# Patient Record
Sex: MALE | Race: WHITE | NOT HISPANIC OR LATINO | Employment: UNEMPLOYED | URBAN - METROPOLITAN AREA
[De-identification: names, ages, dates, MRNs, and addresses within clinical notes are randomized per-mention and may not be internally consistent; named-entity substitution may affect disease eponyms.]

---

## 2017-11-07 PROBLEM — R07.9 ACUTE CHEST PAIN: Status: ACTIVE | Noted: 2017-11-07

## 2017-11-08 PROBLEM — R25.2 BILATERAL LEG CRAMPS: Status: ACTIVE | Noted: 2017-11-08

## 2017-11-08 PROBLEM — Z95.810 AICD (AUTOMATIC CARDIOVERTER/DEFIBRILLATOR) PRESENT: Status: ACTIVE | Noted: 2017-11-08

## 2017-11-08 PROBLEM — I42.0 ISCHEMIC DILATED CARDIOMYOPATHY: Status: ACTIVE | Noted: 2017-11-08

## 2017-11-08 PROBLEM — M62.58 MUSCLE ATROPHY OF LOWER EXTREMITY: Status: ACTIVE | Noted: 2017-11-08

## 2017-11-08 PROBLEM — I20.0 UNSTABLE ANGINA: Status: ACTIVE | Noted: 2017-11-08

## 2017-11-08 PROBLEM — I25.5 ISCHEMIC DILATED CARDIOMYOPATHY: Status: ACTIVE | Noted: 2017-11-08

## 2017-11-09 PROBLEM — I20.0 UNSTABLE ANGINA: Status: RESOLVED | Noted: 2017-11-08 | Resolved: 2017-11-09

## 2017-11-09 PROBLEM — R25.2 BILATERAL LEG CRAMPS: Status: RESOLVED | Noted: 2017-11-08 | Resolved: 2017-11-09

## 2017-11-09 PROBLEM — R07.9 ACUTE CHEST PAIN: Status: RESOLVED | Noted: 2017-11-07 | Resolved: 2017-11-09

## 2017-11-09 PROBLEM — M62.58 MUSCLE ATROPHY OF LOWER EXTREMITY: Status: RESOLVED | Noted: 2017-11-08 | Resolved: 2017-11-09

## 2018-01-18 ENCOUNTER — TELEPHONE (OUTPATIENT)
Dept: NEUROLOGY | Facility: CLINIC | Age: 58
End: 2018-01-18

## 2018-01-18 NOTE — TELEPHONE ENCOUNTER
----- Message from Nunu Kolb sent at 1/18/2018  1:05 PM CST -----  Contact: Pt can be reached at [phone]  Pt is calling to request to schedule an appt for Neuromuscular for second opinion.      Please    Thank you!

## 2018-02-14 ENCOUNTER — OFFICE VISIT (OUTPATIENT)
Dept: NEUROLOGY | Facility: CLINIC | Age: 58
End: 2018-02-14
Payer: COMMERCIAL

## 2018-02-14 VITALS
HEART RATE: 66 BPM | DIASTOLIC BLOOD PRESSURE: 76 MMHG | WEIGHT: 224.19 LBS | BODY MASS INDEX: 27.87 KG/M2 | HEIGHT: 75 IN | SYSTOLIC BLOOD PRESSURE: 111 MMHG

## 2018-02-14 DIAGNOSIS — I10 ESSENTIAL HYPERTENSION: ICD-10-CM

## 2018-02-14 DIAGNOSIS — R20.2 PARESTHESIA OF BOTH LEGS: ICD-10-CM

## 2018-02-14 DIAGNOSIS — R73.9 BORDERLINE HYPERGLYCEMIA: ICD-10-CM

## 2018-02-14 DIAGNOSIS — E78.5 DYSLIPIDEMIA: ICD-10-CM

## 2018-02-14 DIAGNOSIS — M62.58 MUSCLE ATROPHY OF LOWER EXTREMITY: Primary | ICD-10-CM

## 2018-02-14 DIAGNOSIS — R25.2 BILATERAL LEG CRAMPS: ICD-10-CM

## 2018-02-14 PROCEDURE — 99215 OFFICE O/P EST HI 40 MIN: CPT | Mod: S$GLB,,, | Performed by: PSYCHIATRY & NEUROLOGY

## 2018-02-14 PROCEDURE — 3008F BODY MASS INDEX DOCD: CPT | Mod: S$GLB,,, | Performed by: PSYCHIATRY & NEUROLOGY

## 2018-02-14 PROCEDURE — 99999 PR PBB SHADOW E&M-EST. PATIENT-LVL III: CPT | Mod: PBBFAC,,, | Performed by: PSYCHIATRY & NEUROLOGY

## 2018-02-14 RX ORDER — CARBAMAZEPINE 100 MG/1
100 TABLET, EXTENDED RELEASE ORAL 2 TIMES DAILY
Qty: 60 TABLET | Refills: 11 | Status: SHIPPED | OUTPATIENT
Start: 2018-02-14 | End: 2018-05-28 | Stop reason: SDUPTHER

## 2018-02-14 NOTE — LETTER
February 14, 2018      [facility] - Neurology  [address]  [facility] [address]  Phone: [phone]  Fax: [phone]       Patient: Hector Garcia   YOB: 1960  Date of Visit: 02/14/2018    To Whom It May Concern:    Deandre Garcia  was at [facility] on 02/14/2018. He may return to work/school on 2/15/2018 with no restrictions. If you have any questions or concerns, or if I can be of further assistance, please do not hesitate to contact me.    Sincerely,    Kee López MD

## 2018-02-15 ENCOUNTER — PATIENT MESSAGE (OUTPATIENT)
Dept: NEUROLOGY | Facility: CLINIC | Age: 58
End: 2018-02-15

## 2018-02-15 NOTE — PROGRESS NOTES
Subjective:     Chief Complaint:  Consult      History of Present Illness:  Hector Garcia is a 57 y.o. male who presents today for initial evaluation of bilateral LE cramping, paresthesias, and progressive atrophy. He has previously seen Dr. Wilkinson, who referred him here for futher work-up. Symptoms began in April 2017 with troubles in LE with weakness, increased difficulty controlling motor skills, tingling and severe gastrocnemius and foot muscle cramping. UEs have been unaffected. He has noted diminished thigh and calf girth as well as a 20 pound weight loss since then. He still works full time as a  but has been having increasing difficulties standing for prolonged periods of time and his gait is impaired by the end of the day. Her has had work up that produced CSF protein of 94 (90L and 10N) as well as EDX which showed absent LE SNAPs and reduced CMAPs with CVs in the low 30s consistent with demyelination. Results are likely too severe to be explained by DM2 alone. He has had transient elevation of CK in the 600s, but mostly it has been normal. He was initially stopped on statins but that has not improved leg cramping.    Review of Systems  Review of Systems   Constitutional: Positive for activity change, fatigue and unexpected weight change. Negative for fever.   HENT: Negative for hearing loss, trouble swallowing and voice change.    Eyes: Negative for pain, redness and visual disturbance.   Respiratory: Negative for choking, chest tightness and shortness of breath.    Cardiovascular: Negative for chest pain.   Gastrointestinal: Negative for abdominal pain, nausea and vomiting.   Endocrine: Negative for cold intolerance.   Genitourinary: Negative for frequency.   Musculoskeletal: Positive for gait problem and myalgias. Negative for arthralgias, back pain, joint swelling and neck pain.   Skin: Negative for color change.   Allergic/Immunologic: Negative for immunocompromised state.  "  Neurological: Positive for weakness. Negative for dizziness, seizures, speech difficulty, numbness and headaches.   Hematological: Negative for adenopathy.   Psychiatric/Behavioral: Negative for agitation, behavioral problems, dysphoric mood and suicidal ideas.        Objective:     Vitals:    02/14/18 1328   BP: 111/76   Pulse: 66   Weight: 101.7 kg (224 lb 3.3 oz)   Height: 6' 3" (1.905 m)   PainSc:   4       Neurologic Exam     Mental Status   Oriented to person, place, and time.   Attention: normal.   Speech: speech is normal   Level of consciousness: alert  Knowledge: good.     Cranial Nerves     CN II   Visual fields full to confrontation.     CN III, IV, VI   Pupils are equal, round, and reactive to light.  Extraocular motions are normal.     CN V   Facial sensation intact.     CN VII   Facial expression full, symmetric.     CN VIII   Hearing: intact    CN IX, X   CN IX normal.     CN XI   CN XI normal.     CN XII   CN XII normal.     Motor Exam   Muscle bulk: normal  Overall muscle tone: normal    Strength   Strength 5/5 throughout.   Very strong throughout on exam, but he complains of being weaker than his baseline, which I suspect is true.    Thighs remain strong but are thin compared to distal LEs     Sensory Exam   Light touch normal.   Right leg vibration: decreased from ankle  Left leg vibration: decreased from ankle    Gait, Coordination, and Reflexes     Gait  Gait: normal    Tremor   Resting tremor: absent  Intention tremor: absent  Action tremor: absent    Reflexes   Right brachioradialis: 2+  Left brachioradialis: 2+  Right biceps: 2+  Left biceps: 2+  Right triceps: 2+  Left triceps: 2+  Right patellar: 1+  Left patellar: 1+  Right achilles: 1+  Left achilles: 1+  Right plantar: normal  Left plantar: normal  Right Ann: absent  Left Ann: absent      Physical Exam   Constitutional: He is oriented to person, place, and time. He appears well-developed and well-nourished.   HENT:   Head: " Normocephalic and atraumatic.   Eyes: EOM are normal. Pupils are equal, round, and reactive to light.   Neck: Normal range of motion. Neck supple. No thyromegaly present.   Cardiovascular: Normal rate.    Pulmonary/Chest: Effort normal.   Abdominal: Soft.   Lymphadenopathy:     He has no cervical adenopathy.   Neurological: He is oriented to person, place, and time. He has normal strength. Gait normal.   Reflex Scores:       Tricep reflexes are 2+ on the right side and 2+ on the left side.       Bicep reflexes are 2+ on the right side and 2+ on the left side.       Brachioradialis reflexes are 2+ on the right side and 2+ on the left side.       Patellar reflexes are 1+ on the right side and 1+ on the left side.       Achilles reflexes are 1+ on the right side and 1+ on the left side.  Skin: Skin is warm and dry.   Psychiatric: He has a normal mood and affect. His speech is normal and behavior is normal. Thought content normal.   Vitals reviewed.        Lab Results   Component Value Date    WBC 7.70 11/09/2017    HGB 15.3 11/09/2017    HCT 45.9 11/09/2017     (L) 11/09/2017    ALT 30 11/07/2017    AST 30 11/07/2017     02/14/2018    K 5.0 02/14/2018     02/14/2018    CREATININE 1.2 02/14/2018    BUN 20 02/14/2018    CO2 27 02/14/2018    TSH 2.37 11/21/2017    HGBA1C 6.2 (H) 11/21/2017    [id number] 738 11/21/2017       CSF Protein = 94  CSF WBCs = 6 (90L and 10N)    EDX 11/17/2017 from outside facility shows absent sural and common peroneal SNAPs, reduced amplitudes and slow CVs in LE CMAPs consistent with conduction block / temporal dispersion and demyelination (though not reported in the assessment)    Assessment and Plan:     Problem List Items Addressed This Visit     Bilateral leg cramps    Current Assessment & Plan     Worse in the calves and the foot intrinsic muscles, though also occurring in the proximal legs. Worsened with prolonged standing and with use. Labs since April 2017 show a  transient increase in the CK up to 600s, though this is in the setting of working out and prolonged standing at work. Possible element of dehydration and electrolyte deficiency, but primarily related to the process contributing to loss of leg muscle mass and relative weakness. No longer taking statins and there has been no clinical improvement since stopping them several months ago. CSF and NCS suggestive of CIDP, though he does have well-preserved DTRs on examination today.   - Trial of Tegretol 100 Q12 hours   - BMP today and in one month to monitor sodium   - Work on proper hydration           Relevant Orders    BASIC METABOLIC PANEL (Completed)    EMG W/ ULTRASOUND AND NERVE CONDUCTION TEST 2 Extremities    Muscle atrophy of lower extremity - Primary    Current Assessment & Plan     Progressive atrophy since April 2017 along with total weight loss of about 20 pounds, primarily in the LEs. Diminished thigh and calf circumferences. EDX and SCF protein are suggestive of CIDP. Patient is not interested in pursuing IVIg or steroids at this time. He will do some reading of materials provided and discuss with family and let me know in the next few weeks.   - RTC for NCS / EMG since study performed in November 2017 is sub-par.         Paresthesia of both legs    Current Assessment & Plan     Absent sural and superficial peroneal responses on NCS done at outside facility. No abnormalities on pin or light touch, but large fiber neuropathy in bilateral LEs to the ankles. B12, Hep Panel, SPEP, TSH all normal. A1c = 6.2   - Repeat NCS         Dyslipidemia    Current Assessment & Plan     On appropriate medications and managed by PCP. We discussed the importance of managing all secondary stroke risk factors, including dyslipidemia.           Essential hypertension    Current Assessment & Plan     On appropriate medications and managed by PCP. We discussed the importance of managing all secondary stroke risk factors, including  hypertension.           Borderline hyperglycemia    Current Assessment & Plan     On no medications and followed by PCP. We discussed the importance of managing all secondary stroke risk factors, including DM2.               RTC for EDX. Patient will remain in contact via Patient Portal.      Kee López MD  [facility]

## 2018-02-21 ENCOUNTER — TELEPHONE (OUTPATIENT)
Dept: NEUROLOGY | Facility: CLINIC | Age: 58
End: 2018-02-21

## 2018-02-21 NOTE — TELEPHONE ENCOUNTER
Patient  Wife stated that there are no labs ordered and would like for you to call her ----- Message from Wilver Marmolejo sent at 2/21/2018 10:32 AM CST -----  Contact: Karey (wife) @ [phone]  Karey is calling to schedule lab work once orders are in. Pls call.

## 2018-02-27 ENCOUNTER — PATIENT MESSAGE (OUTPATIENT)
Dept: NEUROLOGY | Facility: CLINIC | Age: 58
End: 2018-02-27

## 2018-03-02 ENCOUNTER — TELEPHONE (OUTPATIENT)
Dept: NEUROLOGY | Facility: CLINIC | Age: 58
End: 2018-03-02

## 2018-03-02 DIAGNOSIS — M62.58 MUSCLE ATROPHY OF LOWER EXTREMITY: Primary | ICD-10-CM

## 2018-03-05 ENCOUNTER — TELEPHONE (OUTPATIENT)
Dept: NEUROLOGY | Facility: CLINIC | Age: 58
End: 2018-03-05

## 2018-03-05 RX ORDER — PREDNISONE 20 MG/1
60 TABLET ORAL DAILY
Qty: 90 TABLET | Refills: 1 | Status: SHIPPED | OUTPATIENT
Start: 2018-03-05 | End: 2018-05-05 | Stop reason: SDUPTHER

## 2018-03-05 NOTE — TELEPHONE ENCOUNTER
Flor lieberman stated that patient did get the blood work done and  Would for  You to call in the  Steroid medication for  Mr lópez

## 2018-03-06 NOTE — TREATMENT PLAN
Start Prednisone 60 mg daily and plan to taper once IVIg is initiated and hopefully producing improvements. Pending EDX.    Kee López MD  [facility]

## 2018-03-21 ENCOUNTER — PATIENT MESSAGE (OUTPATIENT)
Dept: NEUROLOGY | Facility: CLINIC | Age: 58
End: 2018-03-21

## 2018-03-27 ENCOUNTER — PROCEDURE VISIT (OUTPATIENT)
Dept: NEUROLOGY | Facility: CLINIC | Age: 58
End: 2018-03-27
Payer: COMMERCIAL

## 2018-03-27 ENCOUNTER — OFFICE VISIT (OUTPATIENT)
Dept: FAMILY MEDICINE | Facility: CLINIC | Age: 58
End: 2018-03-27
Payer: COMMERCIAL

## 2018-03-27 ENCOUNTER — DOCUMENTATION ONLY (OUTPATIENT)
Dept: NEUROLOGY | Facility: CLINIC | Age: 58
End: 2018-03-27

## 2018-03-27 VITALS
WEIGHT: 222.69 LBS | DIASTOLIC BLOOD PRESSURE: 78 MMHG | BODY MASS INDEX: 28.58 KG/M2 | HEIGHT: 74 IN | RESPIRATION RATE: 17 BRPM | OXYGEN SATURATION: 96 % | TEMPERATURE: 98 F | HEART RATE: 81 BPM | SYSTOLIC BLOOD PRESSURE: 130 MMHG

## 2018-03-27 DIAGNOSIS — Z92.241 HISTORY OF CORTICOSTEROID THERAPY: ICD-10-CM

## 2018-03-27 DIAGNOSIS — J20.9 ACUTE BRONCHITIS, UNSPECIFIED ORGANISM: Primary | ICD-10-CM

## 2018-03-27 DIAGNOSIS — R25.2 BILATERAL LEG CRAMPS: ICD-10-CM

## 2018-03-27 PROCEDURE — 3078F DIAST BP <80 MM HG: CPT | Mod: CPTII,S$GLB,, | Performed by: FAMILY MEDICINE

## 2018-03-27 PROCEDURE — 99999 PR PBB SHADOW E&M-EST. PATIENT-LVL III: CPT | Mod: PBBFAC,,, | Performed by: FAMILY MEDICINE

## 2018-03-27 PROCEDURE — 3075F SYST BP GE 130 - 139MM HG: CPT | Mod: CPTII,S$GLB,, | Performed by: FAMILY MEDICINE

## 2018-03-27 PROCEDURE — 95910 NRV CNDJ TEST 7-8 STUDIES: CPT | Mod: S$GLB,,, | Performed by: PSYCHIATRY & NEUROLOGY

## 2018-03-27 PROCEDURE — 95885 MUSC TST DONE W/NERV TST LIM: CPT | Mod: S$GLB,,, | Performed by: PSYCHIATRY & NEUROLOGY

## 2018-03-27 PROCEDURE — 94640 AIRWAY INHALATION TREATMENT: CPT | Mod: S$GLB,,, | Performed by: FAMILY MEDICINE

## 2018-03-27 PROCEDURE — 99203 OFFICE O/P NEW LOW 30 MIN: CPT | Mod: 25,S$GLB,, | Performed by: FAMILY MEDICINE

## 2018-03-27 RX ORDER — GABAPENTIN 100 MG/1
CAPSULE ORAL
COMMUNITY
Start: 2017-12-26 | End: 2018-03-27

## 2018-03-27 RX ORDER — DOXYCYCLINE HYCLATE 100 MG/1
100 TABLET, DELAYED RELEASE ORAL 2 TIMES DAILY
Qty: 14 TABLET | Refills: 0 | Status: SHIPPED | OUTPATIENT
Start: 2018-03-27 | End: 2018-09-27

## 2018-03-27 RX ORDER — IPRATROPIUM BROMIDE AND ALBUTEROL SULFATE 2.5; .5 MG/3ML; MG/3ML
3 SOLUTION RESPIRATORY (INHALATION)
Status: COMPLETED | OUTPATIENT
Start: 2018-03-27 | End: 2018-03-27

## 2018-03-27 RX ORDER — DEXLANSOPRAZOLE 60 MG/1
CAPSULE, DELAYED RELEASE ORAL
COMMUNITY
Start: 2017-12-26 | End: 2018-03-27

## 2018-03-27 RX ORDER — LOSARTAN POTASSIUM 25 MG/1
TABLET ORAL
COMMUNITY
Start: 2018-03-21 | End: 2020-08-10 | Stop reason: SDUPTHER

## 2018-03-27 RX ORDER — ALBUTEROL SULFATE 90 UG/1
2 AEROSOL, METERED RESPIRATORY (INHALATION) EVERY 6 HOURS PRN
Qty: 18 G | Refills: 0 | Status: SHIPPED | OUTPATIENT
Start: 2018-03-27 | End: 2018-04-18 | Stop reason: SDUPTHER

## 2018-03-27 RX ORDER — TIZANIDINE 4 MG/1
TABLET ORAL
COMMUNITY
Start: 2017-12-26 | End: 2018-03-27

## 2018-03-27 RX ORDER — BENZONATATE 200 MG/1
200 CAPSULE ORAL 3 TIMES DAILY PRN
Qty: 30 CAPSULE | Refills: 0 | Status: SHIPPED | OUTPATIENT
Start: 2018-03-27 | End: 2018-04-06

## 2018-03-27 RX ADMIN — IPRATROPIUM BROMIDE AND ALBUTEROL SULFATE 3 ML: 2.5; .5 SOLUTION RESPIRATORY (INHALATION) at 11:03

## 2018-03-27 NOTE — PROGRESS NOTES
"Subjective:       Patient ID: Hector Garcia is a 57 y.o. male.    Chief Complaint: Nasal Congestion and Cough    HPI   57 year old male on daily prednisone for neuropathy, prescribed by neuro, comes in with complaint of 3-4 days of worsening cough, occasionally productive of mucus, wheezing, and shortness of breath. He also has chills and fatigue but no fevers. He has been on prednisone for approximately 3 weeks. He reports that as a child he had bad asthma, but no episodes in decades.     Review of Systems   Constitutional: Positive for chills and fatigue. Negative for fever.   HENT: Positive for rhinorrhea and sore throat. Negative for congestion, ear discharge, postnasal drip and sinus pressure.    Eyes: Negative for discharge.   Respiratory: Positive for cough (productive), shortness of breath and wheezing.    Cardiovascular: Negative for palpitations.   Gastrointestinal: Negative for abdominal pain, blood in stool, constipation and diarrhea.   Genitourinary: Negative for dysuria.   Skin: Negative for rash.       Objective:     /78 (BP Location: Left arm, Patient Position: Sitting, BP Method: Medium (Manual))   Pulse 81   Temp 97.7 °F (36.5 °C) (Oral)   Resp 17   Ht 6' 2" (1.88 m)   Wt 101 kg (222 lb 10.6 oz)   SpO2 96%   BMI 28.59 kg/m²     Physical Exam   Constitutional:  Non-toxic appearance. He appears ill.   HENT:   Right Ear: Tympanic membrane, external ear and ear canal normal.   Left Ear: Tympanic membrane, external ear and ear canal normal.   Nose: Rhinorrhea present.   Mouth/Throat: Posterior oropharyngeal erythema present. No oropharyngeal exudate. No tonsillar exudate.   Neck: Trachea normal and normal range of motion. Neck supple. No thyromegaly present.   Cardiovascular: Normal rate, regular rhythm, S1 normal and S2 normal.    Pulses:       Radial pulses are 2+ on the right side, and 2+ on the left side.   Pulmonary/Chest: He has wheezes. He has rhonchi.   Post nebulizer treatment " with DuoNeb- Wheezing decreased, improved air exchange   Abdominal: Soft. Bowel sounds are normal. There is no hepatosplenomegaly. There is no tenderness.   Lymphadenopathy:     He has no cervical adenopathy.   Skin: Capillary refill takes less than 2 seconds.   Vitals reviewed.      Assessment:       1. Acute bronchitis, unspecified organism    2. History of corticosteroid therapy        Plan:      was seen today for nasal congestion and cough.    Diagnoses and all orders for this visit:    Acute bronchitis, unspecified organism  -     albuterol-ipratropium 2.5mg-0.5mg/3mL nebulizer solution 3 mL; Take 3 mLs by nebulization one time.  -     doxycycline (DORYX) 100 MG EC tablet; Take 1 tablet (100 mg total) by mouth 2 (two) times daily.  -     albuterol 90 mcg/actuation inhaler; Inhale 2 puffs into the lungs every 6 (six) hours as needed for Wheezing. Rescue  -     benzonatate (TESSALON) 200 MG capsule; Take 1 capsule (200 mg total) by mouth 3 (three) times daily as needed for Cough.  Patient has allergy to penicillin, and has a significant heart history as such will avoid macrolides. Will prescribe doxy and start patient on albuterol inhaler.  Patient educated on correct use of inhaler via video: https://www.youAcclaimd.com/watch?v=Axc8a6VKuU4.  Patient was able to explain back how to use inhaler.  Patient requesting hydrocodone based syrup cough suppressant, however, given interaction with patient's Tegretol, informed him this is not recommended.  Prescribed trial of Tessalon.    History of corticosteroid therapy

## 2018-03-27 NOTE — LETTER
March 27, 2018      [facility]  [address]  [place] [address]  Phone: [phone]       Patient: Hector Garcia   YOB: 1960  Date of Visit: 03/27/2018    To Whom It May Concern:    Deandre Garcia  was at [facility] on 03/27/2018. He may return to work on 3/28/18 with no restrictions. If you have any questions or concerns, or if I can be of further assistance, please do not hesitate to contact me.    Sincerely,    Brody Pat Jr., MD

## 2018-03-27 NOTE — PLAN OF CARE
Orders placed on 3/28/2018 for home IVIg therapy per [facility].   - Loading dose 2g/kg divided over 4 days   - Maintenance infusion of 1g/kg divided over 2 days Q3 weeks x 12 weeks   - Typical pre-infusion of Benadryl and Tylenol   - Patient was consented regarding increased blood viscosity and temporary increased risk of renal failure and thrombosis.   - Recent BMP showed normal renal function   - Plan to wean Prednisone once infusions commence and hopeful clinical improvement is seen.   - EDX performed today by Dr. Mcintyre consistent with CIDP    Kee López MD  [facility]

## 2018-04-03 ENCOUNTER — TELEPHONE (OUTPATIENT)
Dept: FAMILY MEDICINE | Facility: CLINIC | Age: 58
End: 2018-04-03

## 2018-04-03 DIAGNOSIS — B37.0 THRUSH, ORAL: Primary | ICD-10-CM

## 2018-04-03 RX ORDER — NYSTATIN 100000 [USP'U]/ML
4 SUSPENSION ORAL 4 TIMES DAILY
Qty: 160 ML | Refills: 0 | Status: SHIPPED | OUTPATIENT
Start: 2018-04-03 | End: 2018-04-13

## 2018-04-03 NOTE — TELEPHONE ENCOUNTER
----- Message from Concetta Caro MA sent at 4/3/2018  1:23 PM CDT -----  Contact: Self       ----- Message -----  From: Bouchra Celaya  Sent: 4/3/2018  10:05 AM  To: [facility] Staff    Patient says he was prescribed antibiotics for 7 days and now he has Thrush. Please call patient at [phone] or [phone].        [facility] [address] [place] [address] EXPY AT [facility]

## 2018-04-12 ENCOUNTER — PATIENT MESSAGE (OUTPATIENT)
Dept: NEUROLOGY | Facility: CLINIC | Age: 58
End: 2018-04-12

## 2018-04-12 ENCOUNTER — TELEPHONE (OUTPATIENT)
Dept: NEUROLOGY | Facility: CLINIC | Age: 58
End: 2018-04-12

## 2018-04-12 NOTE — TELEPHONE ENCOUNTER
Spoke with patient wife stated that his HA1C was 8 point something and is now metformin 500mg 1 tablet twice a day also would like to know if he can get the TDAP vaccine ----- Message from Vianney Alston sent at 4/12/2018  1:17 PM CDT -----  Contact: Karey (wife) @ [phone]  Patient was seen by an  Endocrinologist and was told to call Dr. López with the findings. Says the patient now has full blown diabetes. Please call.

## 2018-04-18 DIAGNOSIS — J20.9 ACUTE BRONCHITIS, UNSPECIFIED ORGANISM: ICD-10-CM

## 2018-04-18 RX ORDER — ALBUTEROL SULFATE 90 UG/1
AEROSOL, METERED RESPIRATORY (INHALATION)
Qty: 18 G | Refills: 0 | Status: SHIPPED | OUTPATIENT
Start: 2018-04-18 | End: 2018-09-27

## 2018-04-24 ENCOUNTER — TELEPHONE (OUTPATIENT)
Dept: NEUROLOGY | Facility: CLINIC | Age: 58
End: 2018-04-24

## 2018-04-24 RX ORDER — ALPRAZOLAM 2 MG/1
2 TABLET, EXTENDED RELEASE ORAL ONCE AS NEEDED
Qty: 10 TABLET | Refills: 1 | Status: SHIPPED | OUTPATIENT
Start: 2018-04-24 | End: 2018-12-27 | Stop reason: SDUPTHER

## 2018-04-24 NOTE — TELEPHONE ENCOUNTER
Spoke with patient wife ----- Message from Wilver Marmolejo sent at 4/24/2018  9:56 AM CDT -----  Contact: Karey @ [phone]  Karey states pt has started IVIG this past weekend, and when IV is going in pt is experiencing anxiety and the pt is asking the doctor prescribe something for the anxiety. Karey is also asking if they can go down on prednisone since they have finished the megadose of the IVIG. Pls call.

## 2018-04-25 NOTE — TELEPHONE ENCOUNTER
----- Message from Rodney Grace sent at 4/24/2018 12:16 PM CDT -----  Contact: Pharmacy @ [phone]  Caller is calling to get clarity on the direction for ( ALPRAZolam (XANAX XR) 2 MG Tb24 )     [facility] [address] [place], [place] [address] AT [address]  [place] [address]  Phone: [phone] Fax: [phone]

## 2018-05-05 RX ORDER — PREDNISONE 20 MG/1
60 TABLET ORAL DAILY
Qty: 90 TABLET | Refills: 1 | Status: SHIPPED | OUTPATIENT
Start: 2018-05-05 | End: 2018-05-28 | Stop reason: SDUPTHER

## 2018-05-28 ENCOUNTER — OFFICE VISIT (OUTPATIENT)
Dept: NEUROLOGY | Facility: CLINIC | Age: 58
End: 2018-05-28
Payer: COMMERCIAL

## 2018-05-28 VITALS
HEART RATE: 89 BPM | DIASTOLIC BLOOD PRESSURE: 67 MMHG | BODY MASS INDEX: 27.72 KG/M2 | HEIGHT: 74 IN | SYSTOLIC BLOOD PRESSURE: 104 MMHG | WEIGHT: 216 LBS

## 2018-05-28 DIAGNOSIS — F17.200 TOBACCO USE DISORDER, MILD, ABUSE: Chronic | ICD-10-CM

## 2018-05-28 DIAGNOSIS — R20.2 PARESTHESIA OF BOTH LEGS: ICD-10-CM

## 2018-05-28 DIAGNOSIS — M62.58 MUSCLE ATROPHY OF LOWER EXTREMITY: ICD-10-CM

## 2018-05-28 DIAGNOSIS — R25.2 BILATERAL LEG CRAMPS: ICD-10-CM

## 2018-05-28 DIAGNOSIS — G61.81 CIDP (CHRONIC INFLAMMATORY DEMYELINATING POLYNEUROPATHY): Primary | ICD-10-CM

## 2018-05-28 DIAGNOSIS — E11.9 TYPE 2 DIABETES MELLITUS WITHOUT COMPLICATION, WITHOUT LONG-TERM CURRENT USE OF INSULIN: Chronic | ICD-10-CM

## 2018-05-28 DIAGNOSIS — I10 ESSENTIAL HYPERTENSION: ICD-10-CM

## 2018-05-28 DIAGNOSIS — E78.5 DYSLIPIDEMIA: ICD-10-CM

## 2018-05-28 PROCEDURE — 3044F HG A1C LEVEL LT 7.0%: CPT | Mod: CPTII,S$GLB,, | Performed by: PSYCHIATRY & NEUROLOGY

## 2018-05-28 PROCEDURE — 3078F DIAST BP <80 MM HG: CPT | Mod: CPTII,S$GLB,, | Performed by: PSYCHIATRY & NEUROLOGY

## 2018-05-28 PROCEDURE — 3074F SYST BP LT 130 MM HG: CPT | Mod: CPTII,S$GLB,, | Performed by: PSYCHIATRY & NEUROLOGY

## 2018-05-28 PROCEDURE — 99214 OFFICE O/P EST MOD 30 MIN: CPT | Mod: S$GLB,,, | Performed by: PSYCHIATRY & NEUROLOGY

## 2018-05-28 PROCEDURE — 99999 PR PBB SHADOW E&M-EST. PATIENT-LVL III: CPT | Mod: PBBFAC,,, | Performed by: PSYCHIATRY & NEUROLOGY

## 2018-05-28 PROCEDURE — 3008F BODY MASS INDEX DOCD: CPT | Mod: CPTII,S$GLB,, | Performed by: PSYCHIATRY & NEUROLOGY

## 2018-05-28 RX ORDER — CARBAMAZEPINE 100 MG/1
200 TABLET, EXTENDED RELEASE ORAL 2 TIMES DAILY
Qty: 120 TABLET | Refills: 11 | Status: SHIPPED | OUTPATIENT
Start: 2018-05-28 | End: 2023-07-05 | Stop reason: SDUPTHER

## 2018-05-28 RX ORDER — CYCLOBENZAPRINE HCL 5 MG
5 TABLET ORAL NIGHTLY
Qty: 30 TABLET | Refills: 11 | Status: SHIPPED | OUTPATIENT
Start: 2018-05-28 | End: 2018-09-27

## 2018-05-28 RX ORDER — PREDNISONE 10 MG/1
40 TABLET ORAL DAILY
Qty: 90 TABLET | Refills: 1 | Status: SHIPPED | OUTPATIENT
Start: 2018-05-28 | End: 2018-07-27

## 2018-05-28 NOTE — ASSESSMENT & PLAN NOTE
On IVIg 1g/kg Q 3 weeks and has received 3 rounds with minimal improvement in strength. He is on Prednisone 40 mg daily and is tapering. He recently saw Dr. Moeller who recommended that the taper start (had previously been on 60 mg daily). He says that Dr. Moeller agreed with IVIg therapy and would like to repeat EDX, which is scheduled to occur in August.   - Continue IVIg as scheduled.   - Prednisone taper of 5mg q10-14 days

## 2018-05-28 NOTE — ASSESSMENT & PLAN NOTE
Prednisone-induced. Previously well-controlled prior to starting steroids, now A1c = 8.7. Steroid taper in process and I expect resolution of the hyperglycemia with eventual removal of the steroids.

## 2018-05-28 NOTE — LETTER
May 28, 2018      Luciano Rivas MD  [address] [place] [address]   [address]  [place] [address]           [facility]  [address]  [place] [address]  Phone: [phone]  Fax: [phone]          Patient: Hector Garcia   MR Number: [id number]   YOB: 1960   Date of Visit: 5/28/2018       Dear Dr. Luciano Rivas:    Thank you for referring Hector Garcia to me for evaluation. Attached you will find relevant portions of my assessment and plan of care.    If you have questions, please do not hesitate to call me. I look forward to following Hector Garcia along with you.    Sincerely,    Kee López MD    Enclosure  CC:  No Recipients    If you would like to receive this communication electronically, please contact [email or web address] or [phone] to request more information on [facility] ([facility] Link access.    For providers and/or their staff who would like to refer a patient to [facility], please contact us through our one-stop-shop provider referral line, [facility], at [phone].    If you feel you have received this communication in error or would no longer like to receive these types of communications, please e-mail [email or web address]

## 2018-05-28 NOTE — ASSESSMENT & PLAN NOTE
Worse in the calves and the foot intrinsic muscles, though also occurring in the proximal legs and in the hands. Worsened with prolonged standing and with use.              - Increase Tegretol to 200 Q12 hours              - Work on proper hydration   - Flexeril 5mg QHS trial

## 2018-07-07 ENCOUNTER — NURSE TRIAGE (OUTPATIENT)
Dept: ADMINISTRATIVE | Facility: CLINIC | Age: 58
End: 2018-07-07

## 2018-08-27 ENCOUNTER — PATIENT MESSAGE (OUTPATIENT)
Dept: NEUROLOGY | Facility: CLINIC | Age: 58
End: 2018-08-27

## 2018-09-27 ENCOUNTER — HOSPITAL ENCOUNTER (OUTPATIENT)
Dept: RADIOLOGY | Facility: HOSPITAL | Age: 58
Discharge: HOME | End: 2018-09-27
Attending: INTERNAL MEDICINE
Payer: COMMERCIAL

## 2018-09-27 DIAGNOSIS — R10.13 ACUTE EPIGASTRIC PAIN: ICD-10-CM

## 2018-09-27 PROBLEM — K59.09 CHRONIC CONSTIPATION: Status: ACTIVE | Noted: 2018-09-27

## 2018-09-27 PROBLEM — R11.0 NAUSEA: Status: ACTIVE | Noted: 2018-09-27

## 2018-09-27 PROCEDURE — 74019 RADEX ABDOMEN 2 VIEWS: CPT | Mod: 26,,, | Performed by: RADIOLOGY

## 2018-09-27 PROCEDURE — 74019 RADEX ABDOMEN 2 VIEWS: CPT | Mod: TC,FY

## 2018-09-27 PROCEDURE — 76700 US EXAM ABDOM COMPLETE: CPT | Mod: 26,,, | Performed by: RADIOLOGY

## 2018-09-27 PROCEDURE — 76700 US EXAM ABDOM COMPLETE: CPT | Mod: TC

## 2018-12-27 RX ORDER — ALPRAZOLAM 2 MG/1
2 TABLET, EXTENDED RELEASE ORAL ONCE AS NEEDED
Qty: 10 TABLET | Refills: 1 | Status: SHIPPED | OUTPATIENT
Start: 2018-12-27 | End: 2019-02-21

## 2019-02-21 PROBLEM — I25.10 CORONARY ARTERY DISEASE: Status: ACTIVE | Noted: 2019-02-21

## 2019-02-21 PROBLEM — I20.9 ANGINA PECTORIS: Status: ACTIVE | Noted: 2019-02-21

## 2019-02-21 PROBLEM — K21.9 GERD WITHOUT ESOPHAGITIS: Status: ACTIVE | Noted: 2019-02-21

## 2019-02-21 PROBLEM — E11.43 TYPE 2 DIABETES MELLITUS WITH DIABETIC AUTONOMIC NEUROPATHY, WITHOUT LONG-TERM CURRENT USE OF INSULIN: Status: ACTIVE | Noted: 2019-02-21

## 2019-04-16 NOTE — PROGRESS NOTES
HPI:   Rosa Arnold is a 78year old female who was seen by me on April 15, 2019 for her Medicare annual physical examination. At the time of examination Mrs. Cervantes was feeling well. She still complains of pain in her knees.   Because of this she does Subjective:     Chief Complaint:  Follow-up    Interval History 5/28/2018 - Bilateral LE weakness is progressing. He does not feel well on Prednisone and is not sure that it has helped at all. His A1c is elevated to 8.7% and he is using Metformin again. He recently saw Dr. Moeller and is planning to have repeat EDX studies performed in August. He is getting IVIg 1g/kg Q3 weeks and has had three rounds total now. He is tolerating the well, but his cramping seems to be worsening, especially after infusions. He reports good pre and post hydration. He continues to work full time. Proximal LE weakness is progressing and he cannot climb a flight of stairs.    History of Present Illness:  Hector Garcia is a 57 y.o. male who presents today for initial evaluation of bilateral LE cramping, paresthesias, and progressive atrophy. He has previously seen Dr. Wilkinson, who referred him here for futher work-up. Symptoms began in April 2017 with troubles in LE with weakness, increased difficulty controlling motor skills, tingling and severe gastrocnemius and foot muscle cramping. UEs have been unaffected. He has noted diminished thigh and calf girth as well as a 20 pound weight loss since then. He still works full time as a  but has been having increasing difficulties standing for prolonged periods of time and his gait is impaired by the end of the day. Her has had work up that produced CSF protein of 94 (90L and 10N) as well as EDX which showed absent LE SNAPs and reduced CMAPs with CVs in the low 30s consistent with demyelination. Results are likely too severe to be explained by DM2 alone. He has had transient elevation of CK in the 600s, but mostly it has been normal. He was initially stopped on statins but that has not improved leg cramping.    Review of Systems  Review of Systems   Constitutional: Positive for activity change, fatigue and unexpected weight change. Negative for fever.   HENT: Negative for hearing  "loss, trouble swallowing and voice change.    Eyes: Negative for pain, redness and visual disturbance.   Respiratory: Negative for choking, chest tightness and shortness of breath.    Cardiovascular: Negative for chest pain.   Gastrointestinal: Negative for abdominal pain, nausea and vomiting.   Endocrine: Negative for cold intolerance.   Genitourinary: Negative for frequency.   Musculoskeletal: Positive for gait problem and myalgias. Negative for arthralgias, back pain, joint swelling and neck pain.   Skin: Negative for color change.   Allergic/Immunologic: Negative for immunocompromised state.   Neurological: Positive for weakness. Negative for dizziness, seizures, speech difficulty, numbness and headaches.   Hematological: Negative for adenopathy.   Psychiatric/Behavioral: Negative for agitation, behavioral problems, dysphoric mood and suicidal ideas.        Objective:     Vitals:    18 1356   BP: 104/67   Pulse: 89   Weight: 98 kg (216 lb)   Height: 6' 2" (1.88 m)   PainSc:   5       Neurologic Exam     Mental Status   Oriented to person, place, and time.   Attention: normal.   Speech: speech is normal   Level of consciousness: alert  Knowledge: good.     Cranial Nerves     CN II   Visual fields full to confrontation.     CN III, IV, VI   Pupils are equal, round, and reactive to light.  Extraocular motions are normal.     CN V   Facial sensation intact.     CN VII   Facial expression full, symmetric.     CN VIII   Hearing: intact    CN IX, X   CN IX normal.     CN XI   CN XI normal.     CN XII   CN XII normal.     Motor Exam   Muscle bulk: decreased  Overall muscle tone: normal    Strength   Strength 5/5 except as noted.   Right quadriceps: 4/5  Left quadriceps: 4/5  Right hamstrin/5  Left hamstrin/5  Right anterior tibial: 4/5  Left anterior tibial: 4/5  Right peroneal: 4/5  Left peroneal: 4/5  Right gastroc: 4/5  Left gastroc: 4/5    Sensory Exam   Light touch normal.   Right leg vibration: " Multiple Vitamins-Minerals (CENTRUM SILVER) Oral Tab Take  by mouth. take 1 tablet by oral route  every day Disp:  Rfl:    Clopidogrel Bisulfate (PLAVIX) 75 MG Oral Tab Take 75 mg by mouth daily.    Disp:  Rfl: 2   torsemide (DEMADEX) 20 MG Oral Tab Take GENERAL: well developed, well nourished,in no apparent distress  SKIN: no rashes,no suspicious lesions  HEENT: atraumatic, normocephalic, normal oropharynx, normal TM's  EYES:PERRLA, EOMI,conjunctiva are clear, sclerae are nonicteric  NECK: supple, no decreased from ankle  Left leg vibration: decreased from ankle    Gait, Coordination, and Reflexes     Gait  Gait: normal    Tremor   Resting tremor: absent  Intention tremor: absent  Action tremor: absent    Reflexes   Right brachioradialis: 2+  Left brachioradialis: 2+  Right biceps: 2+  Left biceps: 2+  Right triceps: 2+  Left triceps: 2+  Right patellar: 1+  Left patellar: 1+  Right achilles: 1+  Left achilles: 1+  Right plantar: normal  Left plantar: normal  Right Ann: absent  Left Ann: absent      Physical Exam   Constitutional: He is oriented to person, place, and time. He appears well-developed and well-nourished.   HENT:   Head: Normocephalic and atraumatic.   Eyes: EOM are normal. Pupils are equal, round, and reactive to light.   Neck: Normal range of motion. Neck supple. No thyromegaly present.   Cardiovascular: Normal rate.    Pulmonary/Chest: Effort normal.   Abdominal: Soft.   Lymphadenopathy:     He has no cervical adenopathy.   Neurological: He is oriented to person, place, and time. Gait normal.   Reflex Scores:       Tricep reflexes are 2+ on the right side and 2+ on the left side.       Bicep reflexes are 2+ on the right side and 2+ on the left side.       Brachioradialis reflexes are 2+ on the right side and 2+ on the left side.       Patellar reflexes are 1+ on the right side and 1+ on the left side.       Achilles reflexes are 1+ on the right side and 1+ on the left side.  Skin: Skin is warm and dry.   Psychiatric: He has a normal mood and affect. His speech is normal and behavior is normal. Thought content normal.   Vitals reviewed.        Lab Results   Component Value Date    WBC 7.70 11/09/2017    HGB 15.3 11/09/2017    HCT 45.9 11/09/2017     (L) 11/09/2017    ALT 30 11/07/2017    AST 30 11/07/2017     02/14/2018    K 5.0 02/14/2018     02/14/2018    CREATININE 1.2 02/14/2018    BUN 20 02/14/2018    CO2 27 02/14/2018    TSH 2.37 11/21/2017    HGBA1C 6.2 (H)  11/21/2017    [id number] 738 11/21/2017       CSF Protein = 94  CSF WBCs = 6 (90L and 10N)    EDX 11/17/2017 from outside facility shows absent sural and common peroneal SNAPs, reduced amplitudes and slow CVs in LE CMAPs consistent with conduction block / temporal dispersion and demyelination (though not reported in the assessment)    Assessment and Plan:     Problem List Items Addressed This Visit     Type 2 diabetes mellitus without complication (Chronic)    Current Assessment & Plan     Prednisone-induced. Previously well-controlled prior to starting steroids, now A1c = 8.7. Steroid taper in process and I expect resolution of the hyperglycemia with eventual removal of the steroids.         Tobacco use disorder, mild, abuse (Chronic)    Current Assessment & Plan     Cessation was encouraged.         CIDP (chronic inflammatory demyelinating polyneuropathy) - Primary    Current Assessment & Plan     On IVIg 1g/kg Q 3 weeks and has received 3 rounds with minimal improvement in strength. He is on Prednisone 40 mg daily and is tapering. He recently saw Dr. Moeller who recommended that the taper start (had previously been on 60 mg daily). He says that Dr. Moeller agreed with IVIg therapy and would like to repeat EDX, which is scheduled to occur in August.   - Continue IVIg as scheduled.   - Prednisone taper of 5mg q10-14 days         Relevant Orders    WALKER FOR HOME USE    Bilateral leg cramps    Current Assessment & Plan     Worse in the calves and the foot intrinsic muscles, though also occurring in the proximal legs and in the hands. Worsened with prolonged standing and with use.              -  Increase Tegretol to 200 Q12 hours              - Work on proper hydration   - Flexeril 5mg QHS trial         Muscle atrophy of lower extremity    Paresthesia of both legs    Dyslipidemia    Current Assessment & Plan     On appropriate medications and managed by PCP. We discussed the importance of managing all secondary  necessary. 2. ASHD (arteriosclerotic heart disease)  Doing well.  CPM.    3. S/P TAVR (transcatheter aortic valve replacement)  Patient had a very good response to her T AVR procedure.   CPM.    4. Chronic systolic congestive heart failure [facility])  Patient mild anemia. Her recent CBC had a hemoglobin 11.3, hematocrit 35.3 and WBC was was 6000. Patient's platelet count was [phone],438. CPM.    - CBC WITH DIFFERENTIAL WITH PLATELET; Future    15.  Urinary tract infection without hematuria, site unspecified  Mary Ann stroke risk factors, including hyperlipidemia.           Essential hypertension    Current Assessment & Plan     On appropriate medications and managed by PCP. We discussed the importance of managing all secondary stroke risk factors, including hypertension.               RTC for EDX. Patient will remain in contact via Patient Portal.      Kee López MD  [facility]   (PHQ-2/PHQ-9): Over the LAST 2 WEEKS   Little interest or pleasure in doing things (over the last two weeks)?: Not at all    Feeling down, depressed, or hopeless (over the last two weeks)?: Not at all    PHQ-2 SCORE: 0        Advance Directives     Do you Correct    Recall \"Tree\": Correct        Yesenia Jackman's SCREENING SCHEDULE   Tests on this list are recommended by your physician but may not be covered, or covered at this frequency, by your insurer.  Please check with your insurance carrier before sc reminders to display for this patient.  Update Health Maintenance if applicable   Immunizations      Influenza Orders placed or performed in visit on 10/17/14   • INFLUENZA VIRUS VACCINE, >=1YEARS OF AGE    Update Immunization Activity if applicable    Pne Annually No results found for this or any previous visit. No flowsheet data found.

## 2019-06-24 DIAGNOSIS — H02.403 PTOSIS OF EYELID, BILATERAL: Primary | ICD-10-CM

## 2019-07-23 ENCOUNTER — CLINICAL SUPPORT (OUTPATIENT)
Dept: OPHTHALMOLOGY | Facility: CLINIC | Age: 59
End: 2019-07-23
Attending: OPHTHALMOLOGY
Payer: COMMERCIAL

## 2019-07-23 DIAGNOSIS — H02.403 PTOSIS OF EYELID, BILATERAL: ICD-10-CM

## 2019-07-23 PROCEDURE — 92285 EXTERNAL PHOTOGRAPHY - OD - RIGHT EYE: ICD-10-PCS | Mod: RT,S$GLB,, | Performed by: OPHTHALMOLOGY

## 2019-07-23 PROCEDURE — 92083 GOLDMANN PERIMETRY - OU - EXTENDED - BOTH EYES: ICD-10-PCS | Mod: S$GLB,,, | Performed by: OPHTHALMOLOGY

## 2019-07-23 PROCEDURE — 92083 EXTENDED VISUAL FIELD XM: CPT | Mod: S$GLB,,, | Performed by: OPHTHALMOLOGY

## 2019-07-23 PROCEDURE — 92285 EXTERNAL OCULAR PHOTOGRAPHY: CPT | Mod: RT,S$GLB,, | Performed by: OPHTHALMOLOGY

## 2019-10-02 ENCOUNTER — TELEPHONE (OUTPATIENT)
Dept: CARDIOLOGY | Facility: CLINIC | Age: 59
End: 2019-10-02

## 2019-10-28 ENCOUNTER — TELEPHONE (OUTPATIENT)
Dept: CARDIOLOGY | Facility: CLINIC | Age: 59
End: 2019-10-28

## 2019-10-28 ENCOUNTER — OFFICE VISIT (OUTPATIENT)
Dept: CARDIOLOGY | Facility: CLINIC | Age: 59
End: 2019-10-28
Payer: COMMERCIAL

## 2019-10-28 VITALS
HEIGHT: 74 IN | SYSTOLIC BLOOD PRESSURE: 109 MMHG | WEIGHT: 216 LBS | HEART RATE: 70 BPM | DIASTOLIC BLOOD PRESSURE: 76 MMHG | BODY MASS INDEX: 27.72 KG/M2

## 2019-10-28 DIAGNOSIS — G61.81 CIDP (CHRONIC INFLAMMATORY DEMYELINATING POLYNEUROPATHY): ICD-10-CM

## 2019-10-28 DIAGNOSIS — Z72.0 TOBACCO USE: ICD-10-CM

## 2019-10-28 DIAGNOSIS — I25.5 ISCHEMIC DILATED CARDIOMYOPATHY: ICD-10-CM

## 2019-10-28 DIAGNOSIS — I25.118 CORONARY ARTERY DISEASE WITH STABLE ANGINA PECTORIS, UNSPECIFIED VESSEL OR LESION TYPE, UNSPECIFIED WHETHER NATIVE OR TRANSPLANTED HEART: ICD-10-CM

## 2019-10-28 DIAGNOSIS — I25.10 CORONARY ARTERY DISEASE WITHOUT ANGINA PECTORIS, UNSPECIFIED VESSEL OR LESION TYPE, UNSPECIFIED WHETHER NATIVE OR TRANSPLANTED HEART: Primary | ICD-10-CM

## 2019-10-28 DIAGNOSIS — Z95.810 AICD (AUTOMATIC CARDIOVERTER/DEFIBRILLATOR) PRESENT: ICD-10-CM

## 2019-10-28 DIAGNOSIS — I42.0 ISCHEMIC DILATED CARDIOMYOPATHY: ICD-10-CM

## 2019-10-28 DIAGNOSIS — E78.5 DYSLIPIDEMIA: ICD-10-CM

## 2019-10-28 DIAGNOSIS — I10 ESSENTIAL HYPERTENSION: ICD-10-CM

## 2019-10-28 PROCEDURE — 3008F BODY MASS INDEX DOCD: CPT | Mod: CPTII,S$GLB,, | Performed by: INTERNAL MEDICINE

## 2019-10-28 PROCEDURE — 3074F PR MOST RECENT SYSTOLIC BLOOD PRESSURE < 130 MM HG: ICD-10-PCS | Mod: CPTII,S$GLB,, | Performed by: INTERNAL MEDICINE

## 2019-10-28 PROCEDURE — 99999 PR PBB SHADOW E&M-EST. PATIENT-LVL III: ICD-10-PCS | Mod: PBBFAC,,, | Performed by: INTERNAL MEDICINE

## 2019-10-28 PROCEDURE — 3078F DIAST BP <80 MM HG: CPT | Mod: CPTII,S$GLB,, | Performed by: INTERNAL MEDICINE

## 2019-10-28 PROCEDURE — 3074F SYST BP LT 130 MM HG: CPT | Mod: CPTII,S$GLB,, | Performed by: INTERNAL MEDICINE

## 2019-10-28 PROCEDURE — 3078F PR MOST RECENT DIASTOLIC BLOOD PRESSURE < 80 MM HG: ICD-10-PCS | Mod: CPTII,S$GLB,, | Performed by: INTERNAL MEDICINE

## 2019-10-28 PROCEDURE — 3008F PR BODY MASS INDEX (BMI) DOCUMENTED: ICD-10-PCS | Mod: CPTII,S$GLB,, | Performed by: INTERNAL MEDICINE

## 2019-10-28 PROCEDURE — 99205 OFFICE O/P NEW HI 60 MIN: CPT | Mod: S$GLB,,, | Performed by: INTERNAL MEDICINE

## 2019-10-28 PROCEDURE — 99999 PR PBB SHADOW E&M-EST. PATIENT-LVL III: CPT | Mod: PBBFAC,,, | Performed by: INTERNAL MEDICINE

## 2019-10-28 PROCEDURE — 99205 PR OFFICE/OUTPT VISIT, NEW, LEVL V, 60-74 MIN: ICD-10-PCS | Mod: S$GLB,,, | Performed by: INTERNAL MEDICINE

## 2019-10-28 RX ORDER — ROSUVASTATIN CALCIUM 5 MG/1
5 TABLET, COATED ORAL NIGHTLY
Qty: 30 TABLET | Refills: 11 | Status: SHIPPED | OUTPATIENT
Start: 2019-10-28 | End: 2019-11-21 | Stop reason: SINTOL

## 2019-10-28 RX ORDER — EPINEPHRINE 0.22MG
100 AEROSOL WITH ADAPTER (ML) INHALATION DAILY
Qty: 30 CAPSULE | Refills: 11
Start: 2019-10-28 | End: 2019-11-24 | Stop reason: CLARIF

## 2019-10-28 NOTE — PATIENT INSTRUCTIONS
Heart Disease Education    The heart beats 60 to 100 times per minute, 24 hours a day. This equals almost 1000,000 times a day. It pumps blood with oxygen and nutrients to the tissues and organs of the body. But the heart is a muscle and needs its own supply of blood. Blood flow to the heart is supplied by the coronary arteries. Coronary artery disease (atherosclerosis) is a result of cholesterol, saturated fat, and calcium deposits (plaques) that build up inside the walls. This causes inflammation within the coronary arteries. These plaques narrow the artery and reduce blood flow to the heart muscle. The reduction in blood flow to the heart muscle decreases oxygen supply to the heart. If the narrowing is significant enough, the oxygen supply to one or more regions of the heart can be temporarily or permanently shut down. This can cause chest pain, and possibly death of heart tissue (heart attack).  Types of chest pain  Angina is the name for pain in the heart muscle. Angina is a warning sign of serious heart disease. When untreated it can lead to a heart attack, also known as acute myocardial infarction, or AMI. Angina occurs when there is not enough blood and oxygen flowing to the heart for the amount of work it is doing. This most often happens during physical exertion, when the heart is working hardest. It is usually relieved by rest or nitroglycerin. Angina may also occur after a large meal when extra blood is sent to the digestive organs and less goes to the heart. In the case of advanced or unstable heart disease, angina can occur at rest or awaken you from sleep. Angina usually lasts from a few minutes up to 20 minutes or more. When treated early, the effects of angina can be reversed without permanent damage to the heart. Angina is a serious condition and needs to be evaluated by a medical professional immediately.  There are two types of angina -- stable and unstable:  · Stable angina usually occurs  with a predictable level of activity. Being stable, its character, severity, and occurrence do not change much over time. It usually starts with activity, and resolves with rest or taking your medicine as instructed by your doctor. The symptoms usually do not last long.  · Unstable angina changes or gets worse over time. It is different from whatever you are used to. It may feel different or worse, begin without cause, occur with exercise or exertion, wake you up from sleep, and last longer. It may not respond in the same way as it does when you take your usual medicines for an attack. This type of angina can be a warning sign of an impending heart attack.     A heart attack is usually the result of a blood clot that suddenly forms in a coronary artery that has been narrowed with plaque. When this occurs, blood flow may be cut off to a part of the heart muscle, causing the cells to die. This weakens the pumping action of the heart, which affects the delivery of blood to all the other organs in the body including the brain. This damage is not reversible. However, early treatment can limit the amount of damage.  The pain you feel with angina and a heart attack may have a similar quality. However, it is usually different in intensity and duration. Here are some typical descriptions of a heart attack:  · It is most often experienced as a squeezing, crushing, pressure-like sensation in the center of the chest.  · It is sometimes described as something heavy sitting on my chest.  · It may feel more like a bad case of indigestion.  · The pain may spread from the chest to the arm, shoulder, throat or jaw.  · Sometimes the pain is not felt in the chest at all, but only in the arm, shoulder, throat or jaw.  · There may also be nausea, vomiting, dizziness or light-headedness, sweating and trouble breathing.  · Palpitations, or your heart beating rapidly  · A new, irregular heart beat  · Unexplained weakness  You may not be  "able to tell the difference between "bad" angina and a heart attack at home. Seek help if your symptoms are different than usual. Do not be in denial or just try to "tough it out."  Call 911  This is the fastest and safest way to get to the emergency department. The paramedics can also start treatment on the way to the hospital, saving valuable time for your heart.  · If the angina gets worse, if it continues, or if it stops and returns, call 911 immediately. Do not delay. You may be having a heart attack.  · After you call 911, take a second tablet or spray unless instructed otherwise. When repeating doses, sit down if possible, because it can make you feel lightheaded or dizzy. Wait another 5 minutes. If the angina still does not go away, take a third tablet or spray. Do not take more than 3 tablets or sprays within 15 minutes. Stay on the phone with 911 for further instruction.  · Your healthcare provider may give you slightly different instructions than those above. If so, follow them carefully.  Do not wait until symptoms become severe to call 911.  Other reasons to call 911 include:  · Trouble breathing  · Feeling lightheaded, faint, or dizzy  · Rapid heart beat  · Slower than usual heart rate compared to your normal  · Angina with weakness, dizziness, fainting, heavy sweating, nausea, or vomiting  · Extreme drowsiness, confusion  · Weakness of an arm or leg or one side of the face  · Difficulty with speech or vision  When to seek medical care  Remember, the signs and symptoms of a heart attack are not always like they are on TV. Sometimes they are not so obvious. You may only feel weak, or just not right. If it is not clear or if you have any doubt, call for advice.  · Seek help if there is a change in the type of pain, if it feels different, or if your symptoms are mild.  · Do not drive yourself. Have someone else drive you. If no one can drive, call 911.  · Do not delay. Fast diagnosis and treatment can " "prevent or limit the amount of heart damage during a heart attack.  · Do not go to your doctor's office or a clinic as they may not be able to provide all the testing and treatment required for this condition.  · If your doctor has given you medicine to take when symptoms occur, take them but don't delay getting help trying to locate medicines.  What happens in the emergency department  The emergency department is connected to your local emergency medical system (EMS) through 911. That's why during a cardiac emergency, calling 911 is the fastest way to get help. The goal of the emergency department is to rapidly screen, evaluate, and treat people.  Once you are there, an electrocardiogram (ECG or heart tracing) will be done. Blood samples may be taken to look for the presence of heart enzymes that leak from damaged heart cells and show if a heart attack is occurring. You will often be evaluated by a heart specialist (cardiologist) who decides the best course of action. In the case of severe angina or early heart attack, and depending on the circumstances, powerful "clot busting" medicines can be used to dissolve blood clots in the coronary artery. In other cases, you may be taken to a cardiac catheterization lab. Here, a tiny balloon-tipped catheter is advanced through blood vessels to the heart. There the balloon is inflated pushing open the blood vessel restoring blood flow.  Risk factors for heart disease  Risk factors for heart disease are a combination of genetic and lifestyle. Many risk factors work by either directly or indirectly damaging the blood vessels of the heart, or by increasing the risk of forming blood or cholesterol clots, which then clog up and block the arteries.     Examples of physical lifestyle risk factors:  · Cigarette smoking  · High blood pressure  · High blood cholesterol  · Use of stimulant drugs such as cocaine, crack, and amphetamines  · Eating a high-fat, high-cholesterol " meal  · Diabetes   · Obesity which increases risk for diabetes and high blood pressure  · Lack of regular physical activity     Examples of emotional lifestyle factors:  · Chronic high stress levels release stress hormones. These raise blood pressure and cholesterol level and makes blood clot more easily.  · Held-in anger, hostile or cynical attitude  · Social and emotional isolation, lack of intimacy  · Loss of relationship  · Depression  Other factors that increase the risk of heart attack that you cannot control :  · Age. The older you get beyond 40, the greater is your risk of significant coronary artery disease.  · Gender. More men than women get heart disease; but once past menopause, women who are not taking estrogen replacement have the same risk as men for a heart attack.  · Family history. If your mother, father, brother or sister has coronary artery disease, your risk of having it is higher than a person your age without this family history.  What can you do to decrease your risk  To reduce your risk of heart disease:  · Get regular checkups with your doctor.  · Take your medicines for blood pressure, cholesterol or diabetes as directed.  · Watch your diet. Eat a heart healthy diet choosing fresh foods, less salt, cholesterol, and fat  · Stop smoking. Get help if needed.  · Get regular exercise.  · Manage stress.  · Carry a list of medicines and doses in your wallet.  Date Last Reviewed: 12/30/2015  © 5134-4873 [employer]. [address] [place] [address], [place] [address]. All rights reserved. This information is not intended as a substitute for professional medical care. Always follow your healthcare professional's instructions.

## 2019-10-28 NOTE — PROGRESS NOTES
Subjective:   Patient ID:  Hector Garcia is a 59 y.o. male who presents to establish care for  Coronary Artery Disease; Congestive Heart Failure; Hyperlipidemia; Hypertension; and tobacco use      HPI:     He is here with his wife to establish cardiovascular care for CAD s/p LCX + OM PCI, patent LAD with mid 70%, and RCA . Last PCI  at [place] by Dr. Ty. He has HTN, HLP, DM II, ICM with ICD, and tobacco. He takes aspirin for MAPT. No statin therapy. He is complaining of angina with moderate exertion. Statins were all discontinued because of mylagias prior to diagnosing him with chronic inflammatory demyelinating polyneuropathy).     Echo 2019     EF 45%      Stress 2019     Inferior wall infarct + small mychal infart   EF 34%              Patient Active Problem List    Diagnosis Date Noted    Type 2 diabetes mellitus with diabetic autonomic neuropathy, without long-term current use of insulin 2019    Coronary artery disease with stable angina pectoris 2019    GERD without esophagitis 2019    Angina pectoris 2019    Nausea 2018    Chronic constipation 2018    Acute epigastric pain 2018    Type 2 diabetes mellitus without complication 2018    CIDP (chronic inflammatory demyelinating polyneuropathy) 2018    Tobacco use disorder, mild, abuse 2018    Paresthesia of both legs 2018    Dyslipidemia 2018    Essential hypertension 2018    Borderline hyperglycemia 2018    AICD (automatic cardioverter/defibrillator) present 2017    Bilateral leg cramps 2017    Muscle atrophy of lower extremity 2017    Ischemic dilated cardiomyopathy 2017           Right Arm BP - Sittin/76  Left Arm BP - Sittin/76        LABS      LAST HbA1c  Lab Results   Component Value Date    HGBA1C 6.2 (H) 2019       Lipid panel  Lab Results   Component Value Date    CHOL 146 2019     Lab  Results   Component Value Date    HDL 26 (L) 02/23/2019     Lab Results   Component Value Date    LDLCALC 91 02/23/2019     Lab Results   Component Value Date    TRIG 193 (H) 02/23/2019     Lab Results   Component Value Date    CHOLHDL 5.6 (H) 02/23/2019            Review of Systems   Constitution: Negative for diaphoresis, night sweats, weight gain and weight loss.   HENT: Negative for congestion.    Eyes: Negative for blurred vision, discharge and double vision.   Cardiovascular: Positive for chest pain. Negative for claudication, cyanosis, dyspnea on exertion, irregular heartbeat, leg swelling, near-syncope, orthopnea, palpitations, paroxysmal nocturnal dyspnea and syncope.   Respiratory: Negative for cough, shortness of breath and wheezing.    Endocrine: Negative for cold intolerance, heat intolerance and polyphagia.   Hematologic/Lymphatic: Negative for adenopathy and bleeding problem. Does not bruise/bleed easily.   Skin: Negative for dry skin and nail changes.   Musculoskeletal: Negative for arthritis, back pain, falls, joint pain, myalgias and neck pain.   Gastrointestinal: Negative for bloating, abdominal pain, change in bowel habit and constipation.   Genitourinary: Negative for bladder incontinence, dysuria, flank pain, genital sores and missed menses.   Neurological: Negative for aphonia, brief paralysis, difficulty with concentration, dizziness and weakness.   Psychiatric/Behavioral: Negative for altered mental status and memory loss. The patient does not have insomnia.    Allergic/Immunologic: Negative for environmental allergies.       Objective:   Physical Exam   Constitutional: He is oriented to person, place, and time. He appears well-developed and well-nourished. He is not intubated.   HENT:   Head: Normocephalic and atraumatic.   Right Ear: External ear normal.   Left Ear: External ear normal.   Mouth/Throat: Oropharynx is clear and moist.   Eyes: Pupils are equal, round, and reactive to light.  Conjunctivae and EOM are normal. Right eye exhibits no discharge. Left eye exhibits no discharge. No scleral icterus.   Neck: Normal range of motion. Neck supple. Normal carotid pulses, no hepatojugular reflux and no JVD present. Carotid bruit is not present. No tracheal deviation present. No thyromegaly present.   Cardiovascular: Normal rate, regular rhythm, S1 normal and S2 normal.  No extrasystoles are present. PMI is not displaced. Exam reveals no gallop, no S3, no distant heart sounds, no friction rub and no midsystolic click.   No murmur heard.  Pulses:       Carotid pulses are 2+ on the right side, and 2+ on the left side.       Radial pulses are 2+ on the right side, and 2+ on the left side.        Femoral pulses are 2+ on the right side, and 2+ on the left side.       Popliteal pulses are 2+ on the right side, and 2+ on the left side.        Dorsalis pedis pulses are 2+ on the right side, and 2+ on the left side.        Posterior tibial pulses are 2+ on the right side, and 2+ on the left side.   Pulmonary/Chest: Effort normal and breath sounds normal. No accessory muscle usage or stridor. No apnea, no tachypnea and no bradypnea. He is not intubated. No respiratory distress. He has no decreased breath sounds. He has no wheezes. He has no rales. He exhibits no tenderness and no bony tenderness.   Abdominal: He exhibits no distension, no pulsatile liver, no abdominal bruit, no ascites, no pulsatile midline mass and no mass. There is no tenderness. There is no rebound and no guarding.   Musculoskeletal: Normal range of motion. He exhibits no edema or tenderness.   Lymphadenopathy:     He has no cervical adenopathy.   Neurological: He is alert and oriented to person, place, and time. He has normal reflexes. No cranial nerve deficit. Coordination normal.   Skin: Skin is warm. No rash noted. No erythema. No pallor.   Psychiatric: He has a normal mood and affect. His behavior is normal. Judgment and thought content  normal.       Assessment:     1. Coronary artery disease without angina pectoris, unspecified vessel or lesion type, unspecified whether native or transplanted heart    2. Essential hypertension    3. Dyslipidemia    4. Ischemic dilated cardiomyopathy    5. AICD (automatic cardioverter/defibrillator) present    6. CIDP (chronic inflammatory demyelinating polyneuropathy)    7. Tobacco use    8. Coronary artery disease with stable angina pectoris, unspecified vessel or lesion type, unspecified whether native or transplanted heart        Plan:         PET stress to assess for ischemia  If PET stress is non ischemic he will have a viability PET        Smoking cessation  Start crestor 5 mg nightly  Use COQ10 100 mg daily  Continue with GMT for CAD + CHF          Continue with current medical plan and lifestyle changes.  Return sooner for concerns or questions. If symptoms persist go to the ED  I have reviewed all pertinent data on this patient       I have reviewed the patient's medical history in detail and updated the computerized patient record.    Orders Placed This Encounter   Procedures    Ambulatory referral to Smoking Cessation Program     Referral Priority:   Routine     Referral Type:   Consultation     Referral Reason:   Specialty Services Required     Requested Specialty:   CTTS     Number of Visits Requested:   1    Cardiac PET Scan Stress     Standing Status:   Future     Standing Expiration Date:   10/28/2020     Order Specific Question:   Which medicaton for the stress procedure?     Answer:   Regadenoson       Follow up as scheduled. Return sooner for concerns or questions            He expressed verbal understanding and agreed with the plan        Greater than 50% of the visit of 45 minutes was spent counseling, educating, and coordinating the care of the patient.        -In today's visit, at least 4 established conditions that pose a risk to life or bodily function have been addressed and the  conditions are severe.    -In today's visit, monitoring for drug toxicity was accomplished.          Patient's Medications   New Prescriptions    COENZYME Q10 100 MG CAPSULE    Take 1 capsule (100 mg total) by mouth once daily.    ROSUVASTATIN (CRESTOR) 5 MG TABLET    Take 1 tablet (5 mg total) by mouth every evening.   Previous Medications    ASPIRIN 325 MG TABLET    Take 1 tablet (325 mg total) by mouth once daily.    CARBAMAZEPINE (TEGRETOL XR) 100 MG 12 HR TABLET    Take 2 tablets (200 mg total) by mouth 2 (two) times daily.    CARVEDILOL (COREG) 3.125 MG TABLET    Take 1 tablet (3.125 mg total) by mouth 2 (two) times daily.    DEXLANSOPRAZOLE (DEXILANT ORAL)    Take 60 mg by mouth Daily.    IMMUNE GLOBULIN,HUM,,CAPR,IGG,, GAMUNEX-C/GAMMAKED, (GAMUNEX-C) 5 GRAM/50 ML (10 %) SOLN    Inject 50 mg/kg into the vein.    ISOSORBIDE MONONITRATE (IMDUR) 60 MG 24 HR TABLET    Take 60 mg by mouth once daily.    LOSARTAN (COZAAR) 25 MG TABLET        METFORMIN (GLUCOPHAGE-XR) 500 MG 24 HR TABLET    Take 500 mg by mouth daily with breakfast.    POLYETHYLENE GLYCOL (GLYCOLAX) 17 GRAM PWPK    Take 1 g by mouth 2 (two) times daily as needed.    RANITIDINE (ZANTAC) 150 MG TABLET    Take 150 mg by mouth once daily.   Modified Medications    No medications on file   Discontinued Medications    No medications on file

## 2019-10-30 ENCOUNTER — CLINICAL SUPPORT (OUTPATIENT)
Dept: CARDIOLOGY | Facility: CLINIC | Age: 59
End: 2019-10-30
Attending: INTERNAL MEDICINE
Payer: COMMERCIAL

## 2019-10-30 VITALS — BODY MASS INDEX: 27.72 KG/M2 | WEIGHT: 216 LBS | HEIGHT: 74 IN

## 2019-10-30 DIAGNOSIS — I25.10 CORONARY ARTERY DISEASE WITHOUT ANGINA PECTORIS, UNSPECIFIED VESSEL OR LESION TYPE, UNSPECIFIED WHETHER NATIVE OR TRANSPLANTED HEART: ICD-10-CM

## 2019-10-30 LAB
% MYOCARDIUM- DEFECT 1: 40 %
CFR FLOW - ANTERIOR: 1.97 CC/MIN/G
CFR FLOW - INFERIOR: 1.24 CC/MIN/G
CFR FLOW - LATERAL: 1.83 CC/MIN/G
CFR FLOW - MAX: 3 CC/MIN/G
CFR FLOW - MIN: 0.4 CC/MIN/G
CFR FLOW - SEPTAL: 2.39 CC/MIN/G
CFR FLOW - WHOLE HEART: 1.86
CFR FLOW- DEFECT 1: 1.03 CC/MIN/G
CV PHARM DOSE: 55 MG
CV STRESS BASE HR: 73 BPM
DIASTOLIC BLOOD PRESSURE: 85 MMHG
END DIASTOLIC INDEX-HIGH: 170 ML/M2
END SYSTOLIC INDEX-HIGH: 70 ML/M2
NUC REST DIASTOLIC VOLUME INDEX: 112
NUC REST EJECTION FRACTION: 32
NUC REST SYSTOLIC VOLUME INDEX: 77
NUC STRESS DIASTOLIC VOLUME INDEX: 139
NUC STRESS EJECTION FRACTION: 31 %
NUC STRESS SYSTOLIC VOLUME INDEX: 96
OHS CV CPX 85 PERCENT MAX PREDICTED HEART RATE MALE: 135
OHS CV CPX MAX PREDICTED HEART RATE: 159
OHS CV CPX PATIENT IS FEMALE: 0
OHS CV CPX PATIENT IS MALE: 1
OHS CV CPX PEAK DIASTOLIC BLOOD PRESSURE: 64 MMHG
OHS CV CPX PEAK HEAR RATE: 78 BPM
OHS CV CPX PEAK RATE PRESSURE PRODUCT: 8346
OHS CV CPX PEAK SYSTOLIC BLOOD PRESSURE: 107 MMHG
OHS CV CPX PERCENT MAX PREDICTED HEART RATE ACHIEVED: 49
OHS CV CPX RATE PRESSURE PRODUCT PRESENTING: 8906
PERFUSION DEFECT 1 SIZE IN %: 35 %
PERFUSION DEFECT SIZE WORSENS % 1: 40 %
REST FLOW - ANTERIOR: 0.68 CC/MIN/G
REST FLOW - INFERIOR: 0.3 CC/MIN/G
REST FLOW - LATERAL: 0.62 CC/MIN/G
REST FLOW - MAX: 1 CC/MIN/G
REST FLOW - MIN: 0.2 CC/MIN/G
REST FLOW - SEPTAL: 0.47 CC/MIN/G
REST FLOW - WHOLE HEART: 0.52 CC/MIN/G
REST FLOW- DEFECT 1: 0.32 CC/MIN/G
RETIRED EF AND QEF - SEE NOTES: 51 %
STRESS ECHO TARGET HR: 135 BPM
STRESS FLOW - ANTERIOR: 1.32 CC/MIN/G
STRESS FLOW - INFERIOR: 0.36 CC/MIN/G
STRESS FLOW - LATERAL: 1.17 CC/MIN/G
STRESS FLOW - MAX: 2 CC/MIN/G
STRESS FLOW - MIN: 0.2 CC/MIN/G
STRESS FLOW - SEPTAL: 1.1 CC/MIN/G
STRESS FLOW - WHOLE HEART: 0.99 CC/MIN/G
STRESS FLOW- DEFECT 1: 0.32 CC/MIN/G
SYSTOLIC BLOOD PRESSURE: 122 MMHG

## 2019-10-30 PROCEDURE — 99999 PR PBB SHADOW E&M-EST. PATIENT-LVL II: ICD-10-PCS | Mod: PBBFAC,,,

## 2019-10-30 PROCEDURE — 93015 CV STRESS TEST SUPVJ I&R: CPT | Mod: S$GLB,,, | Performed by: INTERNAL MEDICINE

## 2019-10-30 PROCEDURE — A9555 CARDIAC PET SCAN STRESS (CUPID ONLY): ICD-10-PCS | Mod: S$GLB,,, | Performed by: INTERNAL MEDICINE

## 2019-10-30 PROCEDURE — 78492 MYOCRD IMG PET MLT RST&STRS: CPT | Mod: S$GLB,,, | Performed by: INTERNAL MEDICINE

## 2019-10-30 PROCEDURE — 78492 CARDIAC PET SCAN STRESS (CUPID ONLY): ICD-10-PCS | Mod: S$GLB,,, | Performed by: INTERNAL MEDICINE

## 2019-10-30 PROCEDURE — A9555 RB82 RUBIDIUM: HCPCS | Mod: S$GLB,,, | Performed by: INTERNAL MEDICINE

## 2019-10-30 PROCEDURE — 99999 PR PBB SHADOW E&M-EST. PATIENT-LVL II: CPT | Mod: PBBFAC,,,

## 2019-10-30 PROCEDURE — 93015 CARDIAC PET SCAN STRESS (CUPID ONLY): ICD-10-PCS | Mod: S$GLB,,, | Performed by: INTERNAL MEDICINE

## 2019-10-30 RX ORDER — DIPYRIDAMOLE 5 MG/ML
54.98 INJECTION INTRAVENOUS
Status: COMPLETED | OUTPATIENT
Start: 2019-10-30 | End: 2019-10-30

## 2019-10-30 RX ADMIN — DIPYRIDAMOLE 55 MG: 5 INJECTION INTRAVENOUS at 02:10

## 2019-11-07 ENCOUNTER — PATIENT MESSAGE (OUTPATIENT)
Dept: CARDIOLOGY | Facility: CLINIC | Age: 59
End: 2019-11-07

## 2019-11-07 ENCOUNTER — TELEPHONE (OUTPATIENT)
Dept: ELECTROPHYSIOLOGY | Facility: CLINIC | Age: 59
End: 2019-11-07

## 2019-11-07 ENCOUNTER — TELEPHONE (OUTPATIENT)
Dept: CARDIOLOGY | Facility: HOSPITAL | Age: 59
End: 2019-11-07

## 2019-11-07 RX ORDER — CARVEDILOL PHOSPHATE 10 MG/1
10 CAPSULE, EXTENDED RELEASE ORAL DAILY
Qty: 90 CAPSULE | Refills: 3 | Status: SHIPPED | OUTPATIENT
Start: 2019-11-07 | End: 2020-10-21

## 2019-11-07 RX ORDER — CARVEDILOL PHOSPHATE 10 MG/1
10 CAPSULE, EXTENDED RELEASE ORAL DAILY
Qty: 30 CAPSULE | Refills: 11 | Status: SHIPPED | OUTPATIENT
Start: 2019-11-07 | End: 2019-11-07

## 2019-11-07 NOTE — TELEPHONE ENCOUNTER
Coreg extended release was sent to pharmacy       30 day to local pharmacy      [facility] day to [facility]      Thanks      ZN

## 2019-11-11 ENCOUNTER — PATIENT MESSAGE (OUTPATIENT)
Dept: CARDIOLOGY | Facility: CLINIC | Age: 59
End: 2019-11-11

## 2019-11-13 ENCOUNTER — TELEPHONE (OUTPATIENT)
Dept: CARDIOLOGY | Facility: CLINIC | Age: 59
End: 2019-11-13

## 2019-11-13 NOTE — TELEPHONE ENCOUNTER
There was no personal matter methicillin-resistant Staph aureus. Jose want me to squeeze patient in schedule to see .  Next week at [place].  I will look into this.      Belkis vance                  ----- Message from Naomi Montoya sent at 11/13/2019  3:44 PM CST -----  Contact: [phone]/ Wife, Karey  Patient requesting to speak with you regarding personal matter. Please call.

## 2019-11-15 ENCOUNTER — TELEPHONE (OUTPATIENT)
Dept: CARDIOLOGY | Facility: HOSPITAL | Age: 59
End: 2019-11-15

## 2019-11-21 ENCOUNTER — OFFICE VISIT (OUTPATIENT)
Dept: CARDIOLOGY | Facility: CLINIC | Age: 59
End: 2019-11-21
Payer: COMMERCIAL

## 2019-11-21 VITALS
DIASTOLIC BLOOD PRESSURE: 67 MMHG | SYSTOLIC BLOOD PRESSURE: 110 MMHG | HEART RATE: 69 BPM | HEIGHT: 74 IN | BODY MASS INDEX: 28.23 KG/M2 | WEIGHT: 220 LBS

## 2019-11-21 DIAGNOSIS — I25.5 ISCHEMIC DILATED CARDIOMYOPATHY: ICD-10-CM

## 2019-11-21 DIAGNOSIS — I25.10 CORONARY ARTERY DISEASE, ANGINA PRESENCE UNSPECIFIED, UNSPECIFIED VESSEL OR LESION TYPE, UNSPECIFIED WHETHER NATIVE OR TRANSPLANTED HEART: ICD-10-CM

## 2019-11-21 DIAGNOSIS — E11.9 TYPE 2 DIABETES MELLITUS WITHOUT COMPLICATION, WITHOUT LONG-TERM CURRENT USE OF INSULIN: Chronic | ICD-10-CM

## 2019-11-21 DIAGNOSIS — E78.5 DYSLIPIDEMIA: ICD-10-CM

## 2019-11-21 DIAGNOSIS — I25.118 CORONARY ARTERY DISEASE WITH STABLE ANGINA PECTORIS, UNSPECIFIED VESSEL OR LESION TYPE, UNSPECIFIED WHETHER NATIVE OR TRANSPLANTED HEART: ICD-10-CM

## 2019-11-21 DIAGNOSIS — I20.9 ANGINA PECTORIS: ICD-10-CM

## 2019-11-21 DIAGNOSIS — R94.39 CARDIOVASCULAR STRESS TEST ABNORMAL: Primary | ICD-10-CM

## 2019-11-21 DIAGNOSIS — I10 ESSENTIAL HYPERTENSION: ICD-10-CM

## 2019-11-21 DIAGNOSIS — Z95.810 AICD (AUTOMATIC CARDIOVERTER/DEFIBRILLATOR) PRESENT: ICD-10-CM

## 2019-11-21 DIAGNOSIS — I42.0 ISCHEMIC DILATED CARDIOMYOPATHY: ICD-10-CM

## 2019-11-21 PROCEDURE — 99999 PR PBB SHADOW E&M-EST. PATIENT-LVL III: CPT | Mod: PBBFAC,,, | Performed by: INTERNAL MEDICINE

## 2019-11-21 PROCEDURE — 3044F PR MOST RECENT HEMOGLOBIN A1C LEVEL <7.0%: ICD-10-PCS | Mod: CPTII,S$GLB,, | Performed by: INTERNAL MEDICINE

## 2019-11-21 PROCEDURE — 3044F HG A1C LEVEL LT 7.0%: CPT | Mod: CPTII,S$GLB,, | Performed by: INTERNAL MEDICINE

## 2019-11-21 PROCEDURE — 3074F SYST BP LT 130 MM HG: CPT | Mod: CPTII,S$GLB,, | Performed by: INTERNAL MEDICINE

## 2019-11-21 PROCEDURE — 3078F DIAST BP <80 MM HG: CPT | Mod: CPTII,S$GLB,, | Performed by: INTERNAL MEDICINE

## 2019-11-21 PROCEDURE — 2024F PR 7 FIELD PHOTOS WITH INTERP/ REVIEW: ICD-10-PCS | Mod: S$GLB,,, | Performed by: INTERNAL MEDICINE

## 2019-11-21 PROCEDURE — 3008F BODY MASS INDEX DOCD: CPT | Mod: CPTII,S$GLB,, | Performed by: INTERNAL MEDICINE

## 2019-11-21 PROCEDURE — 99215 OFFICE O/P EST HI 40 MIN: CPT | Mod: S$GLB,,, | Performed by: INTERNAL MEDICINE

## 2019-11-21 PROCEDURE — 99999 PR PBB SHADOW E&M-EST. PATIENT-LVL III: ICD-10-PCS | Mod: PBBFAC,,, | Performed by: INTERNAL MEDICINE

## 2019-11-21 PROCEDURE — 3008F PR BODY MASS INDEX (BMI) DOCUMENTED: ICD-10-PCS | Mod: CPTII,S$GLB,, | Performed by: INTERNAL MEDICINE

## 2019-11-21 PROCEDURE — 3074F PR MOST RECENT SYSTOLIC BLOOD PRESSURE < 130 MM HG: ICD-10-PCS | Mod: CPTII,S$GLB,, | Performed by: INTERNAL MEDICINE

## 2019-11-21 PROCEDURE — 3078F PR MOST RECENT DIASTOLIC BLOOD PRESSURE < 80 MM HG: ICD-10-PCS | Mod: CPTII,S$GLB,, | Performed by: INTERNAL MEDICINE

## 2019-11-21 PROCEDURE — 2024F 7 FLD RTA PHOTO EVC RTNOPTHY: CPT | Mod: S$GLB,,, | Performed by: INTERNAL MEDICINE

## 2019-11-21 PROCEDURE — 99215 PR OFFICE/OUTPT VISIT, EST, LEVL V, 40-54 MIN: ICD-10-PCS | Mod: S$GLB,,, | Performed by: INTERNAL MEDICINE

## 2019-11-21 RX ORDER — NITROGLYCERIN 0.4 MG/1
0.4 TABLET SUBLINGUAL EVERY 5 MIN PRN
Qty: 20 TABLET | Refills: 12 | Status: SHIPPED | OUTPATIENT
Start: 2019-11-21 | End: 2024-01-04

## 2019-11-21 RX ORDER — ASPIRIN 81 MG/1
81 TABLET ORAL DAILY
Qty: 30 TABLET | Refills: 12
Start: 2019-11-21

## 2019-11-21 NOTE — PROGRESS NOTES
Subjective:   Patient ID:  Hector Garcia is a 59 y.o. male who presents to establish care for  Coronary Artery Disease; Hyperlipidemia; Hypertension; Chest Pain; and Abnormal Stress Test      HPI:     He is here with his wife to establish cardiovascular care for CAD s/p LCX + OM PCI, patent LAD with mid 70%, and RCA . Last PCI 2017 at [place] by Dr. Ty. He has HTN, HLP, DM II, ICM with ICD, and tobacco. He takes aspirin for MAPT. No statin therapy. He is complaining of angina with moderate exertion. Statins were all discontinued because of mylagias prior to diagnosing him with chronic inflammatory demyelinating polyneuropathy).     Echo 6/2019     EF 45%      Stress 6/2019     Inferior wall infarct + small mychal infart   EF 34%      PET stress 10/2019       Large defect in RCA + LCX   35% of LV at rest and increased to 40%   Consistent with infarct + per infarct ischemia   EF 32% at rest and 31% at stress    Apical inferior wall akinesis    Normal LV cavity          Patient Active Problem List    Diagnosis Date Noted    Cardiovascular stress test abnormal 11/21/2019    Type 2 diabetes mellitus with diabetic autonomic neuropathy, without long-term current use of insulin 02/21/2019    Coronary artery disease with stable angina pectoris 02/21/2019    GERD without esophagitis 02/21/2019    Angina pectoris 02/21/2019    Nausea 09/27/2018    Chronic constipation 09/27/2018    Acute epigastric pain 09/27/2018    Type 2 diabetes mellitus without complication 05/28/2018    CIDP (chronic inflammatory demyelinating polyneuropathy) 05/28/2018    Tobacco use disorder, mild, abuse 05/28/2018    Paresthesia of both legs 02/14/2018    Dyslipidemia 02/14/2018    Essential hypertension 02/14/2018    Borderline hyperglycemia 02/14/2018    AICD (automatic cardioverter/defibrillator) present 11/08/2017    Bilateral leg cramps 11/08/2017    Muscle atrophy of lower extremity 11/08/2017    Ischemic dilated  cardiomyopathy 2017           Right Arm BP - Sittin/67  Left Arm BP - Sittin/67        LABS      LAST HbA1c  Lab Results   Component Value Date    HGBA1C 6.2 (H) 2019       Lipid panel  Lab Results   Component Value Date    CHOL 146 2019     Lab Results   Component Value Date    HDL 26 (L) 2019     Lab Results   Component Value Date    LDLCALC 91 2019     Lab Results   Component Value Date    TRIG 193 (H) 2019     Lab Results   Component Value Date    CHOLHDL 5.6 (H) 2019            Review of Systems   Constitution: Negative for diaphoresis, night sweats, weight gain and weight loss.   HENT: Negative for congestion.    Eyes: Negative for blurred vision, discharge and double vision.   Cardiovascular: Positive for chest pain. Negative for claudication, cyanosis, dyspnea on exertion, irregular heartbeat, leg swelling, near-syncope, orthopnea, palpitations, paroxysmal nocturnal dyspnea and syncope.   Respiratory: Negative for cough, shortness of breath and wheezing.    Endocrine: Negative for cold intolerance, heat intolerance and polyphagia.   Hematologic/Lymphatic: Negative for adenopathy and bleeding problem. Does not bruise/bleed easily.   Skin: Negative for dry skin and nail changes.   Musculoskeletal: Negative for arthritis, back pain, falls, joint pain, myalgias and neck pain.   Gastrointestinal: Negative for bloating, abdominal pain, change in bowel habit and constipation.   Genitourinary: Negative for bladder incontinence, dysuria, flank pain, genital sores and missed menses.   Neurological: Negative for aphonia, brief paralysis, difficulty with concentration, dizziness and weakness.   Psychiatric/Behavioral: Negative for altered mental status and memory loss. The patient does not have insomnia.    Allergic/Immunologic: Negative for environmental allergies.       Objective:   Physical Exam   Constitutional: He is oriented to person, place, and time. He  appears well-developed and well-nourished. He is not intubated.   HENT:   Head: Normocephalic and atraumatic.   Right Ear: External ear normal.   Left Ear: External ear normal.   Mouth/Throat: Oropharynx is clear and moist.   Eyes: Pupils are equal, round, and reactive to light. Conjunctivae and EOM are normal. Right eye exhibits no discharge. Left eye exhibits no discharge. No scleral icterus.   Neck: Normal range of motion. Neck supple. Normal carotid pulses, no hepatojugular reflux and no JVD present. Carotid bruit is not present. No tracheal deviation present. No thyromegaly present.   Cardiovascular: Normal rate, regular rhythm, S1 normal and S2 normal.  No extrasystoles are present. PMI is not displaced. Exam reveals no gallop, no S3, no distant heart sounds, no friction rub and no midsystolic click.   No murmur heard.  Pulses:       Carotid pulses are 2+ on the right side, and 2+ on the left side.       Radial pulses are 2+ on the right side, and 2+ on the left side.        Femoral pulses are 2+ on the right side, and 2+ on the left side.       Popliteal pulses are 2+ on the right side, and 2+ on the left side.        Dorsalis pedis pulses are 2+ on the right side, and 2+ on the left side.        Posterior tibial pulses are 2+ on the right side, and 2+ on the left side.   Pulmonary/Chest: Effort normal and breath sounds normal. No accessory muscle usage or stridor. No apnea, no tachypnea and no bradypnea. He is not intubated. No respiratory distress. He has no decreased breath sounds. He has no wheezes. He has no rales. He exhibits no tenderness and no bony tenderness.   Abdominal: He exhibits no distension, no pulsatile liver, no abdominal bruit, no ascites, no pulsatile midline mass and no mass. There is no tenderness. There is no rebound and no guarding.   Musculoskeletal: Normal range of motion. He exhibits no edema or tenderness.   Lymphadenopathy:     He has no cervical adenopathy.   Neurological: He is  "alert and oriented to person, place, and time. He has normal reflexes. No cranial nerve deficit. Coordination normal.   Skin: Skin is warm. No rash noted. No erythema. No pallor.   Psychiatric: He has a normal mood and affect. His behavior is normal. Judgment and thought content normal.       Assessment:     1. Cardiovascular stress test abnormal    2. Coronary artery disease, angina presence unspecified, unspecified vessel or lesion type, unspecified whether native or transplanted heart    3. Coronary artery disease with stable angina pectoris, unspecified vessel or lesion type, unspecified whether native or transplanted heart    4. Essential hypertension    5. Dyslipidemia    6. Ischemic dilated cardiomyopathy    7. AICD (automatic cardioverter/defibrillator) present    8. Angina pectoris    9. Type 2 diabetes mellitus without complication, without long-term current use of insulin        Plan:       PET viability    If viable he will have RCA  intervention    If non viable he will continue with medical therapy      Smoking cessation was discussed but he is not interested at this time   Starte crestor 5 mg nightly but he stopped because of myalgia  He refuses to take either repatha or praluent " he does not want injections"      Continue with current medical plan and lifestyle changes.  Return sooner for concerns or questions. If symptoms persist go to the ED  I have reviewed all pertinent data on this patient       I have reviewed the patient's medical history in detail and updated the computerized patient record.    Orders Placed This Encounter   Procedures    Cardiac PET Scan Viability     Standing Status:   Future     Standing Expiration Date:   11/21/2020       Follow up as scheduled. Return sooner for concerns or questions            He expressed verbal understanding and agreed with the plan        Greater than 50% of the visit of 45 minutes was spent counseling, educating, and coordinating the care of " the patient.        -In today's visit, at least 4 established conditions that pose a risk to life or bodily function have been addressed and the conditions are severe.    -In today's visit, monitoring for drug toxicity was accomplished.          Patient's Medications   New Prescriptions    ASPIRIN (ECOTRIN) 81 MG EC TABLET    Take 1 tablet (81 mg total) by mouth once daily.    NITROGLYCERIN (NITROSTAT) 0.4 MG SL TABLET    Place 1 tablet (0.4 mg total) under the tongue every 5 (five) minutes as needed for Chest pain.   Previous Medications    CARBAMAZEPINE (TEGRETOL XR) 100 MG 12 HR TABLET    Take 2 tablets (200 mg total) by mouth 2 (two) times daily.    CARVEDILOL PHOSPHATE 10 MG ORAL CM24 (COREG CR) 10 MG CM24    Take 1 capsule (10 mg total) by mouth once daily.    COENZYME Q10 100 MG CAPSULE    Take 1 capsule (100 mg total) by mouth once daily.    DEXLANSOPRAZOLE (DEXILANT ORAL)    Take 60 mg by mouth Daily.    IMMUNE GLOBULIN,HUM,,CAPR,IGG,, GAMUNEX-C/GAMMAKED, (GAMUNEX-C) 5 GRAM/50 ML (10 %) SOLN    Inject 50 mg/kg into the vein.    ISOSORBIDE MONONITRATE (IMDUR) 60 MG 24 HR TABLET    Take 60 mg by mouth once daily.    LOSARTAN (COZAAR) 25 MG TABLET        METFORMIN (GLUCOPHAGE-XR) 500 MG 24 HR TABLET    Take 500 mg by mouth daily with breakfast.    RANITIDINE (ZANTAC) 150 MG TABLET    Take 150 mg by mouth once daily.   Modified Medications    No medications on file   Discontinued Medications    ASPIRIN 325 MG TABLET    Take 1 tablet (325 mg total) by mouth once daily.    POLYETHYLENE GLYCOL (GLYCOLAX) 17 GRAM PWPK    Take 1 g by mouth 2 (two) times daily as needed.    ROSUVASTATIN (CRESTOR) 5 MG TABLET    Take 1 tablet (5 mg total) by mouth every evening.

## 2019-11-21 NOTE — PATIENT INSTRUCTIONS
Heart Disease Education    The heart beats 60 to 100 times per minute, 24 hours a day. This equals almost 1000,000 times a day. It pumps blood with oxygen and nutrients to the tissues and organs of the body. But the heart is a muscle and needs its own supply of blood. Blood flow to the heart is supplied by the coronary arteries. Coronary artery disease (atherosclerosis) is a result of cholesterol, saturated fat, and calcium deposits (plaques) that build up inside the walls. This causes inflammation within the coronary arteries. These plaques narrow the artery and reduce blood flow to the heart muscle. The reduction in blood flow to the heart muscle decreases oxygen supply to the heart. If the narrowing is significant enough, the oxygen supply to one or more regions of the heart can be temporarily or permanently shut down. This can cause chest pain, and possibly death of heart tissue (heart attack).  Types of chest pain  Angina is the name for pain in the heart muscle. Angina is a warning sign of serious heart disease. When untreated it can lead to a heart attack, also known as acute myocardial infarction, or AMI. Angina occurs when there is not enough blood and oxygen flowing to the heart for the amount of work it is doing. This most often happens during physical exertion, when the heart is working hardest. It is usually relieved by rest or nitroglycerin. Angina may also occur after a large meal when extra blood is sent to the digestive organs and less goes to the heart. In the case of advanced or unstable heart disease, angina can occur at rest or awaken you from sleep. Angina usually lasts from a few minutes up to 20 minutes or more. When treated early, the effects of angina can be reversed without permanent damage to the heart. Angina is a serious condition and needs to be evaluated by a medical professional immediately.  There are two types of angina -- stable and unstable:  · Stable angina usually occurs  with a predictable level of activity. Being stable, its character, severity, and occurrence do not change much over time. It usually starts with activity, and resolves with rest or taking your medicine as instructed by your doctor. The symptoms usually do not last long.  · Unstable angina changes or gets worse over time. It is different from whatever you are used to. It may feel different or worse, begin without cause, occur with exercise or exertion, wake you up from sleep, and last longer. It may not respond in the same way as it does when you take your usual medicines for an attack. This type of angina can be a warning sign of an impending heart attack.     A heart attack is usually the result of a blood clot that suddenly forms in a coronary artery that has been narrowed with plaque. When this occurs, blood flow may be cut off to a part of the heart muscle, causing the cells to die. This weakens the pumping action of the heart, which affects the delivery of blood to all the other organs in the body including the brain. This damage is not reversible. However, early treatment can limit the amount of damage.  The pain you feel with angina and a heart attack may have a similar quality. However, it is usually different in intensity and duration. Here are some typical descriptions of a heart attack:  · It is most often experienced as a squeezing, crushing, pressure-like sensation in the center of the chest.  · It is sometimes described as something heavy sitting on my chest.  · It may feel more like a bad case of indigestion.  · The pain may spread from the chest to the arm, shoulder, throat or jaw.  · Sometimes the pain is not felt in the chest at all, but only in the arm, shoulder, throat or jaw.  · There may also be nausea, vomiting, dizziness or light-headedness, sweating and trouble breathing.  · Palpitations, or your heart beating rapidly  · A new, irregular heart beat  · Unexplained weakness  You may not be  "able to tell the difference between "bad" angina and a heart attack at home. Seek help if your symptoms are different than usual. Do not be in denial or just try to "tough it out."  Call 911  This is the fastest and safest way to get to the emergency department. The paramedics can also start treatment on the way to the hospital, saving valuable time for your heart.  · If the angina gets worse, if it continues, or if it stops and returns, call 911 immediately. Do not delay. You may be having a heart attack.  · After you call 911, take a second tablet or spray unless instructed otherwise. When repeating doses, sit down if possible, because it can make you feel lightheaded or dizzy. Wait another 5 minutes. If the angina still does not go away, take a third tablet or spray. Do not take more than 3 tablets or sprays within 15 minutes. Stay on the phone with 911 for further instruction.  · Your healthcare provider may give you slightly different instructions than those above. If so, follow them carefully.  Do not wait until symptoms become severe to call 911.  Other reasons to call 911 include:  · Trouble breathing  · Feeling lightheaded, faint, or dizzy  · Rapid heart beat  · Slower than usual heart rate compared to your normal  · Angina with weakness, dizziness, fainting, heavy sweating, nausea, or vomiting  · Extreme drowsiness, confusion  · Weakness of an arm or leg or one side of the face  · Difficulty with speech or vision  When to seek medical care  Remember, the signs and symptoms of a heart attack are not always like they are on TV. Sometimes they are not so obvious. You may only feel weak, or just not right. If it is not clear or if you have any doubt, call for advice.  · Seek help if there is a change in the type of pain, if it feels different, or if your symptoms are mild.  · Do not drive yourself. Have someone else drive you. If no one can drive, call 911.  · Do not delay. Fast diagnosis and treatment can " "prevent or limit the amount of heart damage during a heart attack.  · Do not go to your doctor's office or a clinic as they may not be able to provide all the testing and treatment required for this condition.  · If your doctor has given you medicine to take when symptoms occur, take them but don't delay getting help trying to locate medicines.  What happens in the emergency department  The emergency department is connected to your local emergency medical system (EMS) through 911. That's why during a cardiac emergency, calling 911 is the fastest way to get help. The goal of the emergency department is to rapidly screen, evaluate, and treat people.  Once you are there, an electrocardiogram (ECG or heart tracing) will be done. Blood samples may be taken to look for the presence of heart enzymes that leak from damaged heart cells and show if a heart attack is occurring. You will often be evaluated by a heart specialist (cardiologist) who decides the best course of action. In the case of severe angina or early heart attack, and depending on the circumstances, powerful "clot busting" medicines can be used to dissolve blood clots in the coronary artery. In other cases, you may be taken to a cardiac catheterization lab. Here, a tiny balloon-tipped catheter is advanced through blood vessels to the heart. There the balloon is inflated pushing open the blood vessel restoring blood flow.  Risk factors for heart disease  Risk factors for heart disease are a combination of genetic and lifestyle. Many risk factors work by either directly or indirectly damaging the blood vessels of the heart, or by increasing the risk of forming blood or cholesterol clots, which then clog up and block the arteries.     Examples of physical lifestyle risk factors:  · Cigarette smoking  · High blood pressure  · High blood cholesterol  · Use of stimulant drugs such as cocaine, crack, and amphetamines  · Eating a high-fat, high-cholesterol " meal  · Diabetes   · Obesity which increases risk for diabetes and high blood pressure  · Lack of regular physical activity     Examples of emotional lifestyle factors:  · Chronic high stress levels release stress hormones. These raise blood pressure and cholesterol level and makes blood clot more easily.  · Held-in anger, hostile or cynical attitude  · Social and emotional isolation, lack of intimacy  · Loss of relationship  · Depression  Other factors that increase the risk of heart attack that you cannot control :  · Age. The older you get beyond 40, the greater is your risk of significant coronary artery disease.  · Gender. More men than women get heart disease; but once past menopause, women who are not taking estrogen replacement have the same risk as men for a heart attack.  · Family history. If your mother, father, brother or sister has coronary artery disease, your risk of having it is higher than a person your age without this family history.  What can you do to decrease your risk  To reduce your risk of heart disease:  · Get regular checkups with your doctor.  · Take your medicines for blood pressure, cholesterol or diabetes as directed.  · Watch your diet. Eat a heart healthy diet choosing fresh foods, less salt, cholesterol, and fat  · Stop smoking. Get help if needed.  · Get regular exercise.  · Manage stress.  · Carry a list of medicines and doses in your wallet.  Date Last Reviewed: 12/30/2015  © 7643-6487 [employer]. [address] [place] [address], [place] [address]. All rights reserved. This information is not intended as a substitute for professional medical care. Always follow your healthcare professional's instructions.

## 2019-11-24 ENCOUNTER — HOSPITAL ENCOUNTER (EMERGENCY)
Facility: HOSPITAL | Age: 59
Discharge: HOME | End: 2019-11-25
Attending: EMERGENCY MEDICINE
Payer: COMMERCIAL

## 2019-11-24 DIAGNOSIS — R07.9 CHEST PAIN, UNSPECIFIED TYPE: Primary | ICD-10-CM

## 2019-11-24 DIAGNOSIS — R07.9 CHEST PAIN: ICD-10-CM

## 2019-11-24 DIAGNOSIS — J06.9 VIRAL URI WITH COUGH: ICD-10-CM

## 2019-11-24 DIAGNOSIS — R05.9 COUGH: ICD-10-CM

## 2019-11-24 DIAGNOSIS — R07.89 CHEST WALL PAIN: ICD-10-CM

## 2019-11-24 DIAGNOSIS — R03.0 ELEVATED BLOOD PRESSURE READING: ICD-10-CM

## 2019-11-24 LAB
ALBUMIN SERPL BCP-MCNC: 3.8 G/DL (ref 3.5–5.2)
ALP SERPL-CCNC: 94 U/L (ref 55–135)
ALT SERPL W/O P-5'-P-CCNC: 18 U/L (ref 10–44)
ANION GAP SERPL CALC-SCNC: 8 MMOL/L (ref 8–16)
APTT BLDCRRT: 27.3 SEC (ref 21–32)
AST SERPL-CCNC: 18 U/L (ref 10–40)
BASOPHILS # BLD AUTO: 0.03 K/UL (ref 0–0.2)
BASOPHILS NFR BLD: 0.6 % (ref 0–1.9)
BILIRUB SERPL-MCNC: 0.3 MG/DL (ref 0.1–1)
BNP SERPL-MCNC: 83 PG/ML (ref 0–99)
BUN SERPL-MCNC: 16 MG/DL (ref 8–23)
CALCIUM SERPL-MCNC: 9.4 MG/DL (ref 8.7–10.5)
CHLORIDE SERPL-SCNC: 108 MMOL/L (ref 95–110)
CO2 SERPL-SCNC: 22 MMOL/L (ref 23–29)
CREAT SERPL-MCNC: 1.3 MG/DL (ref 0.5–1.4)
DIFFERENTIAL METHOD: ABNORMAL
EOSINOPHIL # BLD AUTO: 0 K/UL (ref 0–0.5)
EOSINOPHIL NFR BLD: 0.8 % (ref 0–8)
ERYTHROCYTE [DISTWIDTH] IN BLOOD BY AUTOMATED COUNT: 12.1 % (ref 11.5–14.5)
EST. GFR  (AFRICAN AMERICAN): >60 ML/MIN/1.73 M^2
EST. GFR  (NON AFRICAN AMERICAN): 59 ML/MIN/1.73 M^2
GLUCOSE SERPL-MCNC: 121 MG/DL (ref 70–110)
HCT VFR BLD AUTO: 40.5 % (ref 40–54)
HGB BLD-MCNC: 13.9 G/DL (ref 14–18)
IMM GRANULOCYTES # BLD AUTO: 0.01 K/UL (ref 0–0.04)
IMM GRANULOCYTES NFR BLD AUTO: 0.2 % (ref 0–0.5)
INR PPP: 1 (ref 0.8–1.2)
LYMPHOCYTES # BLD AUTO: 1.4 K/UL (ref 1–4.8)
LYMPHOCYTES NFR BLD: 29.7 % (ref 18–48)
MCH RBC QN AUTO: 31.1 PG (ref 27–31)
MCHC RBC AUTO-ENTMCNC: 34.3 G/DL (ref 32–36)
MCV RBC AUTO: 91 FL (ref 82–98)
MONOCYTES # BLD AUTO: 0.5 K/UL (ref 0.3–1)
MONOCYTES NFR BLD: 9.9 % (ref 4–15)
NEUTROPHILS # BLD AUTO: 2.9 K/UL (ref 1.8–7.7)
NEUTROPHILS NFR BLD: 58.8 % (ref 38–73)
NRBC BLD-RTO: 0 /100 WBC
PLATELET # BLD AUTO: 106 K/UL (ref 150–350)
PMV BLD AUTO: 11 FL (ref 9.2–12.9)
POTASSIUM SERPL-SCNC: 3.8 MMOL/L (ref 3.5–5.1)
PROT SERPL-MCNC: 7.7 G/DL (ref 6–8.4)
PROTHROMBIN TIME: 10.7 SEC (ref 9–12.5)
RBC # BLD AUTO: 4.47 M/UL (ref 4.6–6.2)
SODIUM SERPL-SCNC: 138 MMOL/L (ref 136–145)
TROPONIN I SERPL DL<=0.01 NG/ML-MCNC: 0.01 NG/ML (ref 0–0.03)
WBC # BLD AUTO: 4.85 K/UL (ref 3.9–12.7)

## 2019-11-24 PROCEDURE — 85025 COMPLETE CBC W/AUTO DIFF WBC: CPT

## 2019-11-24 PROCEDURE — 93010 EKG 12-LEAD: ICD-10-PCS | Mod: ,,, | Performed by: INTERNAL MEDICINE

## 2019-11-24 PROCEDURE — 99285 EMERGENCY DEPT VISIT HI MDM: CPT | Mod: 25

## 2019-11-24 PROCEDURE — 93005 ELECTROCARDIOGRAM TRACING: CPT

## 2019-11-24 PROCEDURE — 87502 INFLUENZA DNA AMP PROBE: CPT

## 2019-11-24 PROCEDURE — 85610 PROTHROMBIN TIME: CPT

## 2019-11-24 PROCEDURE — 85730 THROMBOPLASTIN TIME PARTIAL: CPT

## 2019-11-24 PROCEDURE — 84484 ASSAY OF TROPONIN QUANT: CPT

## 2019-11-24 PROCEDURE — 93010 ELECTROCARDIOGRAM REPORT: CPT | Mod: ,,, | Performed by: INTERNAL MEDICINE

## 2019-11-24 PROCEDURE — 25000003 PHARM REV CODE 250: Performed by: EMERGENCY MEDICINE

## 2019-11-24 PROCEDURE — 83880 ASSAY OF NATRIURETIC PEPTIDE: CPT

## 2019-11-24 PROCEDURE — 80053 COMPREHEN METABOLIC PANEL: CPT

## 2019-11-24 RX ORDER — ASPIRIN 325 MG
325 TABLET ORAL
Status: COMPLETED | OUTPATIENT
Start: 2019-11-24 | End: 2019-11-24

## 2019-11-24 RX ADMIN — ASPIRIN 325 MG ORAL TABLET 325 MG: 325 PILL ORAL at 11:11

## 2019-11-25 VITALS
HEIGHT: 74 IN | DIASTOLIC BLOOD PRESSURE: 57 MMHG | BODY MASS INDEX: 27.85 KG/M2 | TEMPERATURE: 98 F | RESPIRATION RATE: 19 BRPM | OXYGEN SATURATION: 99 % | SYSTOLIC BLOOD PRESSURE: 106 MMHG | WEIGHT: 217 LBS | HEART RATE: 74 BPM

## 2019-11-25 LAB
CTP QC/QA: YES
POC MOLECULAR INFLUENZA A AGN: NEGATIVE
POC MOLECULAR INFLUENZA B AGN: NEGATIVE
TROPONIN I SERPL DL<=0.01 NG/ML-MCNC: 0.01 NG/ML (ref 0–0.03)

## 2019-11-25 PROCEDURE — 25000242 PHARM REV CODE 250 ALT 637 W/ HCPCS: Performed by: EMERGENCY MEDICINE

## 2019-11-25 PROCEDURE — 84484 ASSAY OF TROPONIN QUANT: CPT

## 2019-11-25 PROCEDURE — 25000003 PHARM REV CODE 250: Performed by: EMERGENCY MEDICINE

## 2019-11-25 PROCEDURE — 94640 AIRWAY INHALATION TREATMENT: CPT

## 2019-11-25 PROCEDURE — 94761 N-INVAS EAR/PLS OXIMETRY MLT: CPT

## 2019-11-25 RX ORDER — PROMETHAZINE HYDROCHLORIDE AND CODEINE PHOSPHATE 6.25; 1 MG/5ML; MG/5ML
5 SOLUTION ORAL EVERY 4 HOURS PRN
Qty: 118 ML | Refills: 0 | Status: SHIPPED | OUTPATIENT
Start: 2019-11-25 | End: 2019-12-05

## 2019-11-25 RX ORDER — PROMETHAZINE HYDROCHLORIDE AND CODEINE PHOSPHATE 6.25; 1 MG/5ML; MG/5ML
5 SOLUTION ORAL
Status: COMPLETED | OUTPATIENT
Start: 2019-11-25 | End: 2019-11-25

## 2019-11-25 RX ORDER — IPRATROPIUM BROMIDE AND ALBUTEROL SULFATE 2.5; .5 MG/3ML; MG/3ML
3 SOLUTION RESPIRATORY (INHALATION)
Status: COMPLETED | OUTPATIENT
Start: 2019-11-25 | End: 2019-11-25

## 2019-11-25 RX ADMIN — IPRATROPIUM BROMIDE AND ALBUTEROL SULFATE 3 ML: .5; 3 SOLUTION RESPIRATORY (INHALATION) at 02:11

## 2019-11-25 RX ADMIN — PROMETHAZINE HYDROCHLORIDE AND CODEINE PHOSPHATE 5 ML: 10; 6.25 SOLUTION ORAL at 03:11

## 2019-11-25 NOTE — ED PROVIDER NOTES
Encounter Date: 11/24/2019       History     Chief Complaint   Patient presents with    Chest Pain     Report chest pain this evening mid chest pressure. No radiating pain. Reports shortness of breath denies n/v     60 yo male presents via personal transportation with epigastric pain and elevated blood pressure. Patient reports he was in his usual state of health today until around 7:30 pm when he developed intermittent epigastric nonradiating pressure-like chest pain. He states this pain was different from his typical anginal pain, which is in the same location, but is more constant and heavier. No diaphoresis or shortness of breath. Patient had wife check his BP and it gradually increased from 140s systolic to 170s systolic, so wife drove him to ED. Epigastric pain was present when patient checked in, but is now absent.     Patient notes he has been coughing for 2 days. His wife babysat her grandchild 6 days ago (Monday 11/18/19) and he was coughing, so she took him to the pediatrician, where he was diagnosed with the flu. Wife has been on Tamiflu. Tonight she gave patient one of her pills. Patient still coughing.     Patient was previously followed by the [facility] at [facility] (Dr. South Cooper and Dr. Sander Ash), but has recently established care with Dr. ELIAN Ellington at [facility].    Per [facility] note, patient has CAD s/p LCX + OM PCI, patent LAD with mid 70%, and RCA . Last PCI 2017 at [facility] by Dr. Ty. Patient has an AICD as well.    Works as fabricator.    PMH: CAD, angina, AICD, ischemic dilated cardiomyopathy, DM2, autonomic neuropathy, GERD, constipation, chronic inflammatory demyelinating polyneuropathy, bilateral leg paresthesia, HTN, DLD    PSH: AICD, coronary stent, tonsillectomy    PET stress 10/30/2019    There is a large sized, severe intensity, basal to distal inferior, inferolateral and inferoseptal resting perfusion abnormality involving 35% of the LV myocardium in the  distribution of the LCX and RCA.  After pharmacologic stress this defect is more severe and larger involving 40.0% of the LV myocardium indicative of infarct with mychal-infarct ischemia.    Within the perfusion abnormality absolute myocardial perfusion (cc/min/gm) averaged 0.32 cc/min/g at rest, 0.32 cc/min/g at stress and CFR was 1.03 cc/min/g, which equates to severely reduced coronary flow capacity in 40.00% of the myocardium (within the LCX and RCA territory).    Whole heart absolute myocardial perfusion (cc/min/g) averaged 0.52 cc/min/g at rest (which is reduced), 0.99 cc/min/g at stress (which is moderately reduced), and 1.86 CFR (which is mildly reduced).    Gated perfusion images showed an ejection fraction of 32% at rest and 31% during stress. Normal ejection fraction is greater than 51%.    There is basal to apical inferior wall severe hypokinesis at rest and basal to apical inferior wall akinesis at stress.    LV cavity size is normal at rest and stress.    The EKG portion of this study is negative for ischemia.    Arrhythmias during stress: occasional PACs, rare PVCs.    The patient reported chest pain during the stress test.    There are no prior studies for comparison.    Consider PET viability study, if clinically indicated.    TTE 6/17/19 [facility]   FINDINGS    Left Ventricle:    Normal left ventricular cavity size. Normal left ventricular wall thickness. Mildly decreased left ventricular systolic function. Left ventricular ejection fraction is estimated at 40-45 %. Grade I/IV diastolic dysfunction (abnormal relaxation filling pattern), normal to mildly elevated filling pressures. Mild global hypokinesis. Definity was used to aid in visualizing endocardial wall motion.    Right Ventricle:    Mildly increased right ventricular size. Mildly decreased right ventricular systolic function. Right ventricular systolic pressure 22.4 mmHg. tapse=14mm, S wave =8cm/s. Catheter/pacemaker wire  visualized in the right ventricle.    Right Atrium:    Normal right atrial size. Catheter/pacemaker wire in the right atrial cavity.    Left Atrium:    Normal left atrial size. Left atrial volume index 19.7 ml/m².    Mitral Valve:    Structurally normal mitral valve. No mitral valve regurgitation.    Aortic Valve:     Trileaflet aortic valve. No aortic valve regurgitation or stenosis.    Tricuspid Valve:     Structurally normal tricuspid valve. Mild tricuspid valve regurgitation.    Pulmonic Valve:     Structurally normal pulmonic valve. No pulmonary valve regurgitation.   Pericardium:    No pericardial effusion.   Aorta:    Normal size aortic root and proximal ascending aorta.   CONCLUSIONS     A complete transthoracic echocardiogram is being done to evaluate a patient with anginal quality chest discomfort and shortness of breath of a few months duration.  There is a history of dilated cardiomyopathy and on electrophysiology study, there was inducible ventricular tachycardia which prompted the placement of an AICD.   MAJOR FINDINGS:     1.  Moderately decreased left ventricular systolic function with EF estimated be 40-45%.     2.  Grade I diastolic dysfunction with normal to mildly elevated left heart filling pressures.     3.  Normal cardiac chamber sizes.     4.  Mild nonrheumatic tricuspid valve regurgitation.     5.  No suggestion of pulmonary hypertension.      6.  A linear density consistent with the patient's single defibrillator lead is now seen coursing through the right heart structures.     Compared to previous echocardiogram 02/27/2017, the patient's AICD lead is now seen.  Left ventricular systolic function remains moderately decreased.  Tricuspid valve regurgitation is slightly greater but is still mild.         Review of patient's allergies indicates:   Allergen Reactions    Penicillins      Past Medical History:   Diagnosis Date    AMI (acute mesenteric ischemia)     Asthma     Coronary artery  disease     Essential hypertension 2/14/2018    GERD (gastroesophageal reflux disease)     Neuromuscular disorder     Type 2 diabetes mellitus without complication 5/28/2018     Past Surgical History:   Procedure Laterality Date    CARDIAC DEFIBRILLATOR PLACEMENT      CARDIAC DEFIBRILLATOR PLACEMENT Left 03/01/2017    CORONARY STENT PLACEMENT      TONSILLECTOMY       History reviewed. No pertinent family history.  Social History     Tobacco Use    Smoking status: Current Every Day Smoker     Packs/day: 0.50     Years: 50.00     Pack years: 25.00     Types: Cigarettes    Smokeless tobacco: Never Used   Substance Use Topics    Alcohol use: No    Drug use: No     Review of Systems   Constitutional: Negative for diaphoresis.   HENT: Negative for sore throat.    Eyes: Negative for visual disturbance.   Respiratory: Negative for chest tightness and shortness of breath.    Cardiovascular: Positive for chest pain (epigastric ).   Gastrointestinal: Positive for abdominal pain (epigastric). Negative for nausea.   Genitourinary: Negative for dysuria.   Musculoskeletal: Negative for back pain and neck stiffness.   Skin: Negative for rash.   Neurological: Negative for light-headedness.       Physical Exam     Initial Vitals [11/24/19 2208]   BP Pulse Resp Temp SpO2   (!) 141/84 86 16 98.2 °F (36.8 °C) 98 %      MAP       --         Physical Exam    Nursing note and vitals reviewed.  Constitutional: He appears well-developed and well-nourished. He is not diaphoretic.   HENT:   Head: Normocephalic and atraumatic.   Mouth/Throat: Oropharynx is clear and moist.   Eyes: Conjunctivae and EOM are normal. Pupils are equal, round, and reactive to light.   Neck: Normal range of motion. Neck supple.   Cardiovascular: Normal rate, regular rhythm, normal heart sounds and intact distal pulses.   No murmur heard.  Pulmonary/Chest: Breath sounds normal. No respiratory distress. He has no wheezes. He has no rhonchi. He has no rales.  He exhibits tenderness (left anterior and midsternal).   L AICD noted   Abdominal: Soft. There is no tenderness. There is no rebound and no guarding.   Musculoskeletal: Normal range of motion. He exhibits no edema or tenderness.   Neurological: He is alert and oriented to person, place, and time. He has normal strength.   Moving all extremities   Skin: Skin is warm and dry.   Psychiatric: He has a normal mood and affect.         ED Course   Procedures  Labs Reviewed   CBC W/ AUTO DIFFERENTIAL - Abnormal; Notable for the following components:       Result Value    RBC 4.47 (*)     Hemoglobin 13.9 (*)     Mean Corpuscular Hemoglobin 31.1 (*)     Platelets 106 (*)     All other components within normal limits   COMPREHENSIVE METABOLIC PANEL - Abnormal; Notable for the following components:    CO2 22 (*)     Glucose 121 (*)     eGFR if non  59 (*)     All other components within normal limits   B-TYPE NATRIURETIC PEPTIDE   TROPONIN I   APTT   PROTIME-INR   TROPONIN I   POCT INFLUENZA A/B MOLECULAR     EKG Readings: (Independently Interpreted)   21:59: NSR, HR 98. Normal axis. No ectopy. Q waves and TWIs in inferior leads. No ectopy. No STEMI.     ECG Results          EKG 12-lead (Final result)  Result time 11/27/19 16:40:24    Final result by Interface, Lab [facility] (11/27/19 16:40:24)                 Narrative:    Test Reason : R07.9,    Vent. Rate : 098 BPM     Atrial Rate : 098 BPM     P-R Int : 178 ms          QRS Dur : 108 ms      QT Int : 364 ms       P-R-T Axes : 086 049 107 degrees     QTc Int : 464 ms    Normal sinus rhythm  Inferior infarct (cited on or before 24-NOV-2019)  Abnormal ECG  When compared with ECG of 30-OCT-2019 14:00,  No significant change was found  Confirmed by Dennis Parekh MD ([id number]) on 11/27/2019 4:40:10 PM    Referred By: [email or web address]   SELF           Confirmed By:Dennis Parekh MD                            Imaging Results          X-Ray Chest AP Portable (Final result)   Result time 11/25/19 00:20:48    Final result by Denisa Brown MD (11/25/19 00:20:48)                 Impression:      No acute cardiopulmonary process identified.      Electronically signed by: Denisa Brown MD  Date:    11/25/2019  Time:    00:20             Narrative:    EXAMINATION:  XR CHEST AP PORTABLE    CLINICAL HISTORY:  Chest pain, unspecified    TECHNIQUE:  Single frontal view of the chest was performed.    COMPARISON:  November 2017.    FINDINGS:  Cardiac silhouette is normal in size.  Left-sided pacer device is seen.  Lungs are symmetrically expanded.  There is minimal left basilar scarring.  No evidence of focal consolidative process, pneumothorax, or significant effusion.  No acute osseous abnormality identified.                              X-Rays:   Independently Interpreted Readings:   Other Readings:  CXR NAD. L AICD.     Medical Decision Making:   History:   Old Medical Records: I decided to obtain old medical records.  Old Records Summarized: records from previous admission(s) and records from clinic visits.  Initial Assessment:   60 yo male with CAD here with epigastric/chest pressure tonight, different than prior angina. Coughing x 2 days, flu exposure.  Differential Diagnosis:   Differential includes ACS, CHF, PE, arrhythmia, PTX, aortic dissection, PNA, musculoskeletal CP, GERD, anxiety, other.   Independently Interpreted Test(s):   I have ordered and independently interpreted X-rays - see prior notes.  I have ordered and independently interpreted EKG Reading(s) - see prior notes  Clinical Tests:   Lab Tests: Reviewed and Ordered  Radiological Study: Ordered and Reviewed  Medical Tests: Reviewed and Ordered  ED Management:  Patient had ASA 325mg in case of ACS.     EKG no STEMI.    CXR NAD.     CBC, CMP, BNP reassuring.    Rapid flu also negative.    Troponin negative x 2. Patient strongly encouraged to accept chest pain OBS as he is at elevated risk of cardiac event by history of CAD  and positive stress test, but he declined. He is aware that his chest pain could have represented acute cardiac event.     Patient had duoneb with some improvement in cough, and phenergan/codeine for cough as well.     I suspect non-cardiac chest pain. I have rx'ed albuterol MDI for cough likely viral with reactive airway component (?COPD) due to long tobacco history. I have also rx'ed phenergan/codeine for home use after discussion of risks (oversedation, tolerance/addiction).    I have strongly advised very close f/u to cardiologist and immediate return to ED for recurrent chest pain or other new/worsening symptoms.     D/c'ed home with wife.                                  Clinical Impression:       ICD-10-CM ICD-9-CM   1. Chest pain, unspecified type R07.9 786.50   2. Chest pain R07.9 786.50   3. Elevated blood pressure reading R03.0 796.2   4. Cough R05 786.2   5. Chest wall pain R07.89 786.52   6. Viral URI with cough J06.9 465.9    B97.89                              Yelena Lozada MD  12/03/19 0305

## 2019-11-25 NOTE — ED TRIAGE NOTES
"Patient reports intermittent midsternal chest pain for a "few hours". Patient describes the sensation as squeezing. Patient reports hx and have a left sided chest pain. Patient reports a dry cough since yesterday. Patient taking wal-tussin, tessalons,  And a dose of wifes tamiflu.   "

## 2019-12-17 ENCOUNTER — TELEPHONE (OUTPATIENT)
Dept: CARDIOLOGY | Facility: CLINIC | Age: 59
End: 2019-12-17

## 2019-12-19 ENCOUNTER — CLINICAL SUPPORT (OUTPATIENT)
Dept: CARDIOLOGY | Facility: CLINIC | Age: 59
End: 2019-12-19
Attending: INTERNAL MEDICINE
Payer: COMMERCIAL

## 2019-12-19 VITALS — HEART RATE: 65 BPM | SYSTOLIC BLOOD PRESSURE: 152 MMHG | DIASTOLIC BLOOD PRESSURE: 83 MMHG

## 2019-12-19 DIAGNOSIS — I25.10 CORONARY ARTERY DISEASE, ANGINA PRESENCE UNSPECIFIED, UNSPECIFIED VESSEL OR LESION TYPE, UNSPECIFIED WHETHER NATIVE OR TRANSPLANTED HEART: ICD-10-CM

## 2019-12-19 LAB — PERFUSION DEFECT 1 SIZE IN %: 36 %

## 2019-12-19 PROCEDURE — 78459 CARDIAC PET SCAN VIABILITY (CUPID ONLY): ICD-10-PCS | Mod: S$GLB,,, | Performed by: INTERNAL MEDICINE

## 2019-12-19 PROCEDURE — 99999 PR PBB SHADOW E&M-EST. PATIENT-LVL I: ICD-10-PCS | Mod: PBBFAC,,,

## 2019-12-19 PROCEDURE — A9552 F18 FDG: HCPCS | Mod: S$GLB,,, | Performed by: INTERNAL MEDICINE

## 2019-12-19 PROCEDURE — 78459 MYOCRD IMG PET SINGLE STUDY: CPT | Mod: S$GLB,,, | Performed by: INTERNAL MEDICINE

## 2019-12-19 PROCEDURE — A9555 RB82 RUBIDIUM: HCPCS | Mod: S$GLB,,, | Performed by: INTERNAL MEDICINE

## 2019-12-19 PROCEDURE — A9555 CARDIAC PET SCAN VIABILITY (CUPID ONLY): ICD-10-PCS | Mod: S$GLB,,, | Performed by: INTERNAL MEDICINE

## 2019-12-19 PROCEDURE — 99999 PR PBB SHADOW E&M-EST. PATIENT-LVL I: CPT | Mod: PBBFAC,,,

## 2019-12-19 PROCEDURE — A9552 CARDIAC PET SCAN VIABILITY (CUPID ONLY): ICD-10-PCS | Mod: S$GLB,,, | Performed by: INTERNAL MEDICINE

## 2019-12-19 NOTE — PROGRESS NOTES
Your test revealed a non viable inferior wall. We will continue with medical therapy. There is no role to open the artery in this situation as the area is already scarred      We will discuss this in detail during your follow up      Thanks      ZN

## 2019-12-20 ENCOUNTER — TELEPHONE (OUTPATIENT)
Dept: CARDIOLOGY | Facility: CLINIC | Age: 59
End: 2019-12-20

## 2019-12-20 NOTE — TELEPHONE ENCOUNTER
Reached out to pt to advise about test results     Pt wife advised about PET stress results and scheduled clinical f/u

## 2019-12-20 NOTE — TELEPHONE ENCOUNTER
----- Message from Jan Ellington MD sent at 12/19/2019  4:46 PM CST -----      Your test revealed a non viable inferior wall. We will continue with medical therapy. There is no role to open the artery in this situation as the area is already scarred      We will discuss this in detail during your follow up      Thanks      ZN

## 2020-02-17 ENCOUNTER — OFFICE VISIT (OUTPATIENT)
Dept: CARDIOLOGY | Facility: CLINIC | Age: 60
End: 2020-02-17
Payer: COMMERCIAL

## 2020-02-17 VITALS
OXYGEN SATURATION: 97 % | BODY MASS INDEX: 28.36 KG/M2 | HEART RATE: 74 BPM | SYSTOLIC BLOOD PRESSURE: 108 MMHG | HEIGHT: 74 IN | WEIGHT: 221 LBS | DIASTOLIC BLOOD PRESSURE: 69 MMHG

## 2020-02-17 DIAGNOSIS — I42.0 ISCHEMIC DILATED CARDIOMYOPATHY: ICD-10-CM

## 2020-02-17 DIAGNOSIS — E78.5 DYSLIPIDEMIA: ICD-10-CM

## 2020-02-17 DIAGNOSIS — Z95.810 AICD (AUTOMATIC CARDIOVERTER/DEFIBRILLATOR) PRESENT: ICD-10-CM

## 2020-02-17 DIAGNOSIS — S91.001A ANKLE WOUND, RIGHT, INITIAL ENCOUNTER: ICD-10-CM

## 2020-02-17 DIAGNOSIS — F17.200 TOBACCO USE DISORDER, MILD, ABUSE: Chronic | ICD-10-CM

## 2020-02-17 DIAGNOSIS — I10 ESSENTIAL HYPERTENSION: ICD-10-CM

## 2020-02-17 DIAGNOSIS — M62.58 MUSCLE ATROPHY OF LOWER EXTREMITY: ICD-10-CM

## 2020-02-17 DIAGNOSIS — R94.39 CARDIOVASCULAR STRESS TEST ABNORMAL: ICD-10-CM

## 2020-02-17 DIAGNOSIS — I25.5 ISCHEMIC DILATED CARDIOMYOPATHY: ICD-10-CM

## 2020-02-17 DIAGNOSIS — E11.43 TYPE 2 DIABETES MELLITUS WITH DIABETIC AUTONOMIC NEUROPATHY, WITHOUT LONG-TERM CURRENT USE OF INSULIN: ICD-10-CM

## 2020-02-17 DIAGNOSIS — I25.118 CORONARY ARTERY DISEASE WITH STABLE ANGINA PECTORIS, UNSPECIFIED VESSEL OR LESION TYPE, UNSPECIFIED WHETHER NATIVE OR TRANSPLANTED HEART: Primary | ICD-10-CM

## 2020-02-17 PROCEDURE — 3008F PR BODY MASS INDEX (BMI) DOCUMENTED: ICD-10-PCS | Mod: CPTII,S$GLB,, | Performed by: INTERNAL MEDICINE

## 2020-02-17 PROCEDURE — 99999 PR PBB SHADOW E&M-EST. PATIENT-LVL III: ICD-10-PCS | Mod: PBBFAC,,, | Performed by: INTERNAL MEDICINE

## 2020-02-17 PROCEDURE — 2024F PR 7 FIELD PHOTOS WITH INTERP/ REVIEW: ICD-10-PCS | Mod: S$GLB,,, | Performed by: INTERNAL MEDICINE

## 2020-02-17 PROCEDURE — 3074F PR MOST RECENT SYSTOLIC BLOOD PRESSURE < 130 MM HG: ICD-10-PCS | Mod: CPTII,S$GLB,, | Performed by: INTERNAL MEDICINE

## 2020-02-17 PROCEDURE — 3044F HG A1C LEVEL LT 7.0%: CPT | Mod: CPTII,S$GLB,, | Performed by: INTERNAL MEDICINE

## 2020-02-17 PROCEDURE — 3044F PR MOST RECENT HEMOGLOBIN A1C LEVEL <7.0%: ICD-10-PCS | Mod: CPTII,S$GLB,, | Performed by: INTERNAL MEDICINE

## 2020-02-17 PROCEDURE — 3078F PR MOST RECENT DIASTOLIC BLOOD PRESSURE < 80 MM HG: ICD-10-PCS | Mod: CPTII,S$GLB,, | Performed by: INTERNAL MEDICINE

## 2020-02-17 PROCEDURE — 3078F DIAST BP <80 MM HG: CPT | Mod: CPTII,S$GLB,, | Performed by: INTERNAL MEDICINE

## 2020-02-17 PROCEDURE — 99215 PR OFFICE/OUTPT VISIT, EST, LEVL V, 40-54 MIN: ICD-10-PCS | Mod: S$GLB,,, | Performed by: INTERNAL MEDICINE

## 2020-02-17 PROCEDURE — 3074F SYST BP LT 130 MM HG: CPT | Mod: CPTII,S$GLB,, | Performed by: INTERNAL MEDICINE

## 2020-02-17 PROCEDURE — 99215 OFFICE O/P EST HI 40 MIN: CPT | Mod: S$GLB,,, | Performed by: INTERNAL MEDICINE

## 2020-02-17 PROCEDURE — 99999 PR PBB SHADOW E&M-EST. PATIENT-LVL III: CPT | Mod: PBBFAC,,, | Performed by: INTERNAL MEDICINE

## 2020-02-17 PROCEDURE — 2024F 7 FLD RTA PHOTO EVC RTNOPTHY: CPT | Mod: S$GLB,,, | Performed by: INTERNAL MEDICINE

## 2020-02-17 PROCEDURE — 3008F BODY MASS INDEX DOCD: CPT | Mod: CPTII,S$GLB,, | Performed by: INTERNAL MEDICINE

## 2020-02-17 RX ORDER — CLINDAMYCIN HYDROCHLORIDE 300 MG/1
CAPSULE ORAL
COMMUNITY
Start: 2020-02-12 | End: 2024-01-04

## 2020-02-17 NOTE — LETTER
February 17, 2020      [place] [facility]  [address]  [place] [address]  Phone: [phone]       Patient: Hector Garcia   YOB: 1960  Date of Visit: 02/17/2020    To Whom It May Concern:    Deandre Garcia  was at [facility] on 02/17/2020. He can return rober work on 2/24/2020 without restrictions. If you have any questions or concerns, or if I can be of further assistance, please do not hesitate to contact me.    Sincerely,    Jan Ellington MD

## 2020-02-17 NOTE — PATIENT INSTRUCTIONS
Taking Aspirin for Atherosclerosis       Aspirin is a medicine often prescribed to treat atherosclerosis. This condition affects your arteries. These are the blood vessels that carry blood away from your heart. Having atherosclerosis means youre at greater risk of a heart attack or stroke. Aspirin can help prevent these from happening.  How atherosclerosis affects your arteries   A fatty material (plaque) can build up in your arteries. This makes it harder for blood to flow through them. A blood clot can then form on the plaque. This may block the artery, cutting off blood flow. This can cause conditions such as coronary artery disease (CAD) and peripheral arterial disease (PAD):  · CAD occurs when plaque builds up in the coronary artery. This artery supplies the heart with oxygen-rich blood.  · PAD occurs when plaque forms in leg arteries.  The same things that cause CAD and PAD can also cause plaque to form in other arteries in your body, such as those in the brain. When plaque occurs in any of these arteries, it raises your risk of heart attack or stroke.  What aspirin does  Aspirin is a blood-thinner (antiplatelet medicine). It helps keep blood clots from forming. This reduces the risk of blockage. Aspirin can be taken daily if you are at high risk of or have already had a heart attack or stroke. It is also used after a procedure called a stent placement. This is when a tiny wire mesh tube, or stent, is placed in an artery to keep it open. Aspirin helps prevent blood clots from forming on the stent.  Taking aspirin safely  Tell your healthcare provider about any medicines you are taking. This includes:  · All prescription medicines  · Over-the-counter medicines  · Herbs, vitamins, and other supplements  Also mention if you have a history of ulcers or bleeding problems. Ask whether you will need to stop taking aspirin before having surgery or dental work. Always take medicines as directed.  Tips for taking  aspirin  · Have a routine. For example, take aspirin with the same meal each day.  · Dont take more than prescribed. A low dose gives the same benefit as a higher one, with a lower risk of side effects.  · Dont skip doses. Aspirin needs to be taken each day to work well.  · Keep track of what you take. A pillbox with days of the week can help, especially if you take several medicines. Or use a list or chart to keep track.  When to call your healthcare provider  Side effects of aspirin are not usually serious. If you do have problems, changing your dose may help. Call your healthcare provider if you have any of the following:  · Excessive bruising (some bruising is normal)  · Nosebleeds, bleeding gums, or other excessive bleeding  · An upset stomach or stomach pain   Date Last Reviewed: 6/1/2016  © 6555-6606 [employer]. [address] [place] [address]. All rights reserved. This information is not intended as a substitute for professional medical care. Always follow your healthcare professional's instructions.        Smoking and Peripheral Arterial Disease (PAD)  Smoking is the greatest single danger to the health of your arteries. It puts you at higher risk for peripheral arterial disease (PAD). PAD is a disease of the arteries in the legs. If you have PAD, its likely that other parts of your body are diseased, too. That puts you at high risk for heart attack or stroke. Read on to learn how smoking can lead to PAD and affect your health.  How can smoking lead to PAD?  Smoking causes swelling and redness (inflammation) that leads to plaque forming. Plaque is a waxy material made up of cholesterol and other particles. It can build up in your artery walls. When there is too much plaque, your arteries can become narrowed and restrict blood flow. This then raises your risk for PAD and blood clots. It also worsens other risk factors, such as high blood pressure and high cholesterol. These are  things that make you more likely to have artery disease.  What happens if you dont quit smoking?  · You have 2 to 4 times the risk of dying from a heart attack or stroke as a nonsmoker.  · You have a greater risk of getting severe PAD, pain in your legs when walking (claudication), dead body tissue due to lack of blood flow (gangrene), or having a leg or foot amputated.  · You are at greater risk for abdominal aortic aneurysm (AAA). This is a bulge in the aorta, a major artery. It can burst suddenly and be fatal.  What happens if you quit smoking?  · Your risk for heart attack and stroke drops as soon as you quit smoking.  · After 1 year of not smoking, your risk for heart attack falls by 50%.  · In 5 to 15 years after you quit, your risk for heart attack or stroke is the same as someone who never smoked.  · Your risk for amputation and other complications of PAD is reduced.  · Your risk of developing AAA decreases.     For more information  · [employer]/cgwk-ns-rk-expert  · [facility] Smoking Quitline:9-733-52S-QUIT (0-916-322-1390)      Date Last Reviewed: 6/1/2016  © 8126-3675 [employer]. [address] [place] [address], [place] [address]. All rights reserved. This information is not intended as a substitute for professional medical care. Always follow your healthcare professional's instructions.

## 2020-02-18 ENCOUNTER — PATIENT MESSAGE (OUTPATIENT)
Dept: CARDIOLOGY | Facility: CLINIC | Age: 60
End: 2020-02-18

## 2020-02-21 ENCOUNTER — HOSPITAL ENCOUNTER (OUTPATIENT)
Dept: RADIOLOGY | Facility: HOSPITAL | Age: 60
Discharge: HOME | End: 2020-02-21
Attending: PODIATRIST
Payer: COMMERCIAL

## 2020-02-21 ENCOUNTER — HOSPITAL ENCOUNTER (OUTPATIENT)
Dept: WOUND CARE | Facility: HOSPITAL | Age: 60
Discharge: HOME | End: 2020-02-21
Attending: PODIATRIST
Payer: COMMERCIAL

## 2020-02-21 DIAGNOSIS — E11.43 TYPE 2 DIABETES MELLITUS WITH DIABETIC AUTONOMIC NEUROPATHY, WITHOUT LONG-TERM CURRENT USE OF INSULIN: Primary | ICD-10-CM

## 2020-02-21 DIAGNOSIS — S91.001A ANKLE WOUND, RIGHT, INITIAL ENCOUNTER: ICD-10-CM

## 2020-02-21 PROCEDURE — 11042 PR DEBRIDEMENT, SKIN, SUB-Q TISSUE,=<20 SQ CM: ICD-10-PCS | Mod: ,,, | Performed by: PODIATRIST

## 2020-02-21 PROCEDURE — 11042 DBRDMT SUBQ TIS 1ST 20SQCM/<: CPT | Mod: ,,, | Performed by: PODIATRIST

## 2020-02-21 PROCEDURE — 99203 OFFICE O/P NEW LOW 30 MIN: CPT | Mod: 25,,, | Performed by: PODIATRIST

## 2020-02-21 PROCEDURE — 73610 XR ANKLE COMPLETE 3 VIEW RIGHT: ICD-10-PCS | Mod: 26,RT,, | Performed by: RADIOLOGY

## 2020-02-21 PROCEDURE — 73610 X-RAY EXAM OF ANKLE: CPT | Mod: TC,FY,RT

## 2020-02-21 PROCEDURE — 99203 PR OFFICE/OUTPT VISIT, NEW, LEVL III, 30-44 MIN: ICD-10-PCS | Mod: 25,,, | Performed by: PODIATRIST

## 2020-02-21 PROCEDURE — 73610 X-RAY EXAM OF ANKLE: CPT | Mod: 26,RT,, | Performed by: RADIOLOGY

## 2020-02-28 NOTE — PROGRESS NOTES
Subjective:   Patient ID:  Hector Garcia is a 59 y.o. male who presents to establish care for  Coronary Artery Disease; Congestive Heart Failure; Hyperlipidemia; Hypertension; Nicotine Dependence; and R leg wound      HPI:     He is here with his wife to establish cardiovascular care for CAD s/p LCX + OM PCI, patent LAD with mid 70%, and RCA . Last PCI 2017 at [facility] by Dr. Ty. He has HTN, HLP, DM II, ICM with ICD, and tobacco. He takes aspirin for MAPT. No statin therapy. He is complaining of angina with moderate exertion. Statins were all discontinued because of mylagias prior to diagnosing him with chronic inflammatory demyelinating polyneuropathy).     Echo 6/2019     EF 45%      Stress 6/2019     Inferior wall infarct + small mychal infart   EF 34%      PET stress 10/2019       Large defect in RCA + LCX   35% of LV at rest and increased to 40%   Consistent with infarct + per infarct ischemia   EF 32% at rest and 31% at stress    Apical inferior wall akinesis    Normal LV cavity        PET viability 12/2019     Non viable inferior wall (RCA )              Patient Active Problem List    Diagnosis Date Noted    Cardiovascular stress test abnormal 11/21/2019         PET stress + viability 2019      Non viable inferior wall (RCA )        Type 2 diabetes mellitus with diabetic autonomic neuropathy, without long-term current use of insulin 02/21/2019    Coronary artery disease with stable angina pectoris 02/21/2019    GERD without esophagitis 02/21/2019    Angina pectoris 02/21/2019    Nausea 09/27/2018    Chronic constipation 09/27/2018    Acute epigastric pain 09/27/2018    Type 2 diabetes mellitus without complication 05/28/2018    CIDP (chronic inflammatory demyelinating polyneuropathy) 05/28/2018    Tobacco use disorder, mild, abuse 05/28/2018    Paresthesia of both legs 02/14/2018    Dyslipidemia 02/14/2018    Essential hypertension 02/14/2018    Borderline hyperglycemia  2018    AICD (automatic cardioverter/defibrillator) present 2017    Bilateral leg cramps 2017    Muscle atrophy of lower extremity 2017    Ischemic dilated cardiomyopathy 2017           Right Arm BP - Sittin/69  Left Arm BP - Sittin/60        LABS      LAST HbA1c  Lab Results   Component Value Date    HGBA1C 6.2 (H) 2019       Lipid panel  Lab Results   Component Value Date    CHOL 146 2019     Lab Results   Component Value Date    HDL 26 (L) 2019     Lab Results   Component Value Date    LDLCALC 91 2019     Lab Results   Component Value Date    TRIG 193 (H) 2019     Lab Results   Component Value Date    CHOLHDL 5.6 (H) 2019            Review of Systems   Constitution: Negative for diaphoresis, night sweats, weight gain and weight loss.   HENT: Negative for congestion.    Eyes: Negative for blurred vision, discharge and double vision.   Cardiovascular: Positive for chest pain. Negative for claudication, cyanosis, dyspnea on exertion, irregular heartbeat, leg swelling, near-syncope, orthopnea, palpitations, paroxysmal nocturnal dyspnea and syncope.   Respiratory: Negative for cough, shortness of breath and wheezing.    Endocrine: Negative for cold intolerance, heat intolerance and polyphagia.   Hematologic/Lymphatic: Negative for adenopathy and bleeding problem. Does not bruise/bleed easily.   Skin: Negative for dry skin and nail changes.   Musculoskeletal: Negative for arthritis, back pain, falls, joint pain, myalgias and neck pain.   Gastrointestinal: Negative for bloating, abdominal pain, change in bowel habit and constipation.   Genitourinary: Negative for bladder incontinence, dysuria, flank pain, genital sores and missed menses.   Neurological: Negative for aphonia, brief paralysis, difficulty with concentration, dizziness and weakness.   Psychiatric/Behavioral: Negative for altered mental status and memory loss. The patient does  not have insomnia.    Allergic/Immunologic: Negative for environmental allergies.       Objective:   Physical Exam   Constitutional: He is oriented to person, place, and time. He appears well-developed and well-nourished. He is not intubated.   HENT:   Head: Normocephalic and atraumatic.   Right Ear: External ear normal.   Left Ear: External ear normal.   Mouth/Throat: Oropharynx is clear and moist.   Eyes: Pupils are equal, round, and reactive to light. Conjunctivae and EOM are normal. Right eye exhibits no discharge. Left eye exhibits no discharge. No scleral icterus.   Neck: Normal range of motion. Neck supple. Normal carotid pulses, no hepatojugular reflux and no JVD present. Carotid bruit is not present. No tracheal deviation present. No thyromegaly present.   Cardiovascular: Normal rate, regular rhythm, S1 normal and S2 normal.  No extrasystoles are present. PMI is not displaced. Exam reveals no gallop, no S3, no distant heart sounds, no friction rub and no midsystolic click.   No murmur heard.  Pulses:       Carotid pulses are 2+ on the right side, and 2+ on the left side.       Radial pulses are 2+ on the right side, and 2+ on the left side.        Femoral pulses are 2+ on the right side, and 2+ on the left side.       Popliteal pulses are 2+ on the right side, and 2+ on the left side.        Dorsalis pedis pulses are 2+ on the right side, and 2+ on the left side.        Posterior tibial pulses are 2+ on the right side, and 2+ on the left side.   Pulmonary/Chest: Effort normal and breath sounds normal. No accessory muscle usage or stridor. No apnea, no tachypnea and no bradypnea. He is not intubated. No respiratory distress. He has no decreased breath sounds. He has no wheezes. He has no rales. He exhibits no tenderness and no bony tenderness.   Abdominal: He exhibits no distension, no pulsatile liver, no abdominal bruit, no ascites, no pulsatile midline mass and no mass. There is no tenderness. There is no  "rebound and no guarding.   Musculoskeletal: Normal range of motion. He exhibits no edema or tenderness.   Lymphadenopathy:     He has no cervical adenopathy.   Neurological: He is alert and oriented to person, place, and time. He has normal reflexes. No cranial nerve deficit. Coordination normal.   Skin: Skin is warm. No rash noted. No erythema. No pallor.   Psychiatric: He has a normal mood and affect. His behavior is normal. Judgment and thought content normal.       Assessment:     1. Coronary artery disease with stable angina pectoris, unspecified vessel or lesion type, unspecified whether native or transplanted heart    2. Ankle wound, right, initial encounter    3. AICD (automatic cardioverter/defibrillator) present    4. Ischemic dilated cardiomyopathy    5. Dyslipidemia    6. Essential hypertension    7. Cardiovascular stress test abnormal    8. Tobacco use disorder, mild, abuse    9. Muscle atrophy of lower extremity    10. Type 2 diabetes mellitus with diabetic autonomic neuropathy, without long-term current use of insulin        Plan:     His PET stress revealed a non viable myocardium. No intervention needed. Medical therapy for CAD.         Smoking cessation was discussed. He has reduced his tobacco intake  Started crestor 5 mg nightly but he stopped because of myalgia  He refuses to take either repatha or praluent " he does not want injections"        Referral to wound care for right foot traumatic wound   His pulses are adequate for healing   Education for proper foot care was provided           Continue with current medical plan and lifestyle changes.  Return sooner for concerns or questions. If symptoms persist go to the ED  I have reviewed all pertinent data on this patient       I have reviewed the patient's medical history in detail and updated the computerized patient record.    Orders Placed This Encounter   Procedures    Ambulatory referral/consult to Wound Clinic     Standing Status:   " Future     Number of Occurrences:   1     Standing Expiration Date:   3/17/2021     Referral Priority:   Routine     Referral Type:   Consultation     Referral Reason:   Specialty Services Required     Referred to Provider:   Brittnee Owusu DPM     Requested Specialty:   Wound Care     Number of Visits Requested:   1       Follow up as scheduled. Return sooner for concerns or questions            He expressed verbal understanding and agreed with the plan        Greater than 50% of the visit of 45 minutes was spent counseling, educating, and coordinating the care of the patient.        -In today's visit, at least 4 established conditions that pose a risk to life or bodily function have been addressed and the conditions are severe.    -In today's visit, monitoring for drug toxicity was accomplished.          Patient's Medications   New Prescriptions    COLLAGENASE (SANTYL) OINTMENT    Apply topically once daily.   Previous Medications    ALBUTEROL SULFATE (PROAIR RESPICLICK) 90 MCG/ACTUATION AEPB    Inhale 2 puffs into the lungs every 4 (four) hours. Rescue    ASPIRIN (ECOTRIN) 81 MG EC TABLET    Take 1 tablet (81 mg total) by mouth once daily.    CARBAMAZEPINE (TEGRETOL XR) 100 MG 12 HR TABLET    Take 2 tablets (200 mg total) by mouth 2 (two) times daily.    CARVEDILOL PHOSPHATE 10 MG ORAL CM24 (COREG CR) 10 MG CM24    Take 1 capsule (10 mg total) by mouth once daily.    CLINDAMYCIN (CLEOCIN) 300 MG CAPSULE        DEXLANSOPRAZOLE (DEXILANT ORAL)    Take 60 mg by mouth Daily.    IMMUNE GLOBULIN,HUM,,CAPR,IGG,, GAMUNEX-C/GAMMAKED, (GAMUNEX-C) 5 GRAM/50 ML (10 %) SOLN    Inject 50 mg/kg into the vein.    ISOSORBIDE MONONITRATE (IMDUR) 60 MG 24 HR TABLET    Take 60 mg by mouth once daily.    LOSARTAN (COZAAR) 25 MG TABLET        METFORMIN (GLUCOPHAGE-XR) 500 MG 24 HR TABLET    Take 500 mg by mouth daily with breakfast.    NITROGLYCERIN (NITROSTAT) 0.4 MG SL TABLET    Place 1 tablet (0.4 mg total) under the tongue every 5  (five) minutes as needed for Chest pain.   Modified Medications    No medications on file   Discontinued Medications    No medications on file

## 2020-03-06 ENCOUNTER — HOSPITAL ENCOUNTER (OUTPATIENT)
Dept: WOUND CARE | Facility: HOSPITAL | Age: 60
Discharge: HOME | End: 2020-03-06
Attending: PODIATRIST
Payer: COMMERCIAL

## 2020-03-06 DIAGNOSIS — E11.43 TYPE 2 DIABETES MELLITUS WITH DIABETIC AUTONOMIC NEUROPATHY, WITHOUT LONG-TERM CURRENT USE OF INSULIN: Primary | ICD-10-CM

## 2020-03-06 DIAGNOSIS — E11.622 DIABETIC ULCER OF RIGHT ANKLE WITH FAT LAYER EXPOSED: ICD-10-CM

## 2020-03-06 DIAGNOSIS — L97.312 DIABETIC ULCER OF RIGHT ANKLE WITH FAT LAYER EXPOSED: ICD-10-CM

## 2020-03-06 PROCEDURE — 11042 DBRDMT SUBQ TIS 1ST 20SQCM/<: CPT | Mod: ,,, | Performed by: PODIATRIST

## 2020-03-06 PROCEDURE — 99499 NO LOS: ICD-10-PCS | Mod: ,,, | Performed by: PODIATRIST

## 2020-03-06 PROCEDURE — 11042 DBRDMT SUBQ TIS 1ST 20SQCM/<: CPT | Performed by: PODIATRIST

## 2020-03-06 PROCEDURE — 27201912 HC WOUND CARE DEBRIDEMENT SUPPLIES: Performed by: PODIATRIST

## 2020-03-06 PROCEDURE — 11042 PR DEBRIDEMENT, SKIN, SUB-Q TISSUE,=<20 SQ CM: ICD-10-PCS | Mod: ,,, | Performed by: PODIATRIST

## 2020-03-06 PROCEDURE — 99499 UNLISTED E&M SERVICE: CPT | Mod: ,,, | Performed by: PODIATRIST

## 2020-03-17 ENCOUNTER — HOSPITAL ENCOUNTER (OUTPATIENT)
Dept: WOUND CARE | Facility: HOSPITAL | Age: 60
Discharge: HOME | End: 2020-03-17
Attending: FAMILY MEDICINE
Payer: COMMERCIAL

## 2020-03-17 VITALS
SYSTOLIC BLOOD PRESSURE: 168 MMHG | DIASTOLIC BLOOD PRESSURE: 95 MMHG | RESPIRATION RATE: 17 BRPM | HEART RATE: 74 BPM | TEMPERATURE: 97 F

## 2020-03-17 DIAGNOSIS — E11.621 TYPE 2 DIABETES MELLITUS WITH FOOT ULCER, WITHOUT LONG-TERM CURRENT USE OF INSULIN: ICD-10-CM

## 2020-03-17 DIAGNOSIS — L97.509 TYPE 2 DIABETES MELLITUS WITH FOOT ULCER, WITHOUT LONG-TERM CURRENT USE OF INSULIN: ICD-10-CM

## 2020-03-17 PROCEDURE — 99214 OFFICE O/P EST MOD 30 MIN: CPT | Mod: ,,, | Performed by: FAMILY MEDICINE

## 2020-03-17 PROCEDURE — 99214 PR OFFICE/OUTPT VISIT, EST, LEVL IV, 30-39 MIN: ICD-10-PCS | Mod: ,,, | Performed by: FAMILY MEDICINE

## 2020-03-17 PROCEDURE — 99203 OFFICE O/P NEW LOW 30 MIN: CPT | Performed by: FAMILY MEDICINE

## 2020-03-17 NOTE — PROGRESS NOTES
[facility]                Progress Note    Subjective:       Patient ID: Hector Garcia is a 59 y.o. male.    Chief Complaint: Non-healing Wound Follow Up    3/17/2020: F/U visit. Wound is healed. Applied aquacel foam border to protect the healed area. Keep the border intact for few days. Keep drying dry.       Review of Systems   Constitutional: Negative.    HENT: Negative.    Eyes: Negative.    Respiratory: Negative.    Cardiovascular: Negative.    Gastrointestinal: Negative.    Endocrine: Negative.    Skin: Negative.    Neurological: Positive for numbness.   Psychiatric/Behavioral: Negative.          Objective:        Physical Exam   Constitutional: He is oriented to person, place, and time. He appears well-developed and well-nourished.   Eyes: Pupils are equal, round, and reactive to light. Conjunctivae and EOM are normal.   Neck: Normal range of motion. Neck supple.   Cardiovascular: Normal rate and regular rhythm.   Pulmonary/Chest: Effort normal. No stridor. No respiratory distress.   Abdominal: Soft. He exhibits no distension. There is no tenderness.   Musculoskeletal: Normal range of motion.   Neurological: He is alert and oriented to person, place, and time.   Skin: Skin is warm and dry.   Psychiatric: He has a normal mood and affect.       Vitals:    03/17/20 0931   BP: (!) 168/95   Pulse: 74   Resp: 17   Temp: 97.1 °F (36.2 °C)       Assessment:           ICD-10-CM ICD-9-CM   1. Type 2 diabetes mellitus with foot ulcer, without long-term current use of insulin E11.621 250.80    L97.509 707.15            Wound lateral Malleolus/Ankle #1 (Active)        Pre-existing: Yes   Primary Wound Type:    Side: Right   Orientation: lateral   Location: Malleolus/Ankle   Wound/PI Number (optional): #1   Ankle-Brachial Index:    Pulses:    Removal Indication and Assessment:    Wound Outcome: Healed   (Retired) Wound Type:    (Retired) Wound Length (cm):    (Retired) Wound  Width (cm):    (Retired) Depth (cm):    Wound Description (Comments):    Removal Indications:    Wound Image   3/17/2020  9:32 AM   Wound WDL WDL 3/17/2020  9:32 AM   Dressing Appearance Intact;Dry;Clean 3/17/2020  9:32 AM   Drainage Amount None 3/17/2020  9:32 AM   Drainage Characteristics/Odor Serous 3/17/2020  9:32 AM   Appearance Epithelialization 3/17/2020  9:32 AM   Black (%), Wound Tissue Color 0 % 3/17/2020  9:32 AM   Yellow (%), Wound Tissue Color 0 % 3/17/2020  9:32 AM   Periwound Area Scar tissue 3/17/2020  9:32 AM   Wound Length (cm) 0 cm 3/17/2020  9:32 AM   Wound Width (cm) 0 cm 3/17/2020  9:32 AM   Wound Depth (cm) 0 cm 3/17/2020  9:32 AM   Wound Volume (cm^3) 0 cm^3 3/17/2020  9:32 AM   Wound Surface Area (cm^2) 0 cm^2 3/17/2020  9:32 AM   Care Soap and water;Wound cleanser;Sterile normal saline 3/17/2020  9:32 AM   Dressing Applied 3/17/2020  9:32 AM   Periwound Care Other (see comments) 3/17/2020  9:32 AM           Plan:            1.  Wound healed  2.  Follow up with podiatry as scheduled  3.  Will protect wound site today to help prevent breakdown, so foam dressing applied  4.  Call should skin reopen  5.  Follow up in wound care as needed      Ronal Albarado MD

## 2020-08-10 RX ORDER — LOSARTAN POTASSIUM 25 MG/1
25 TABLET ORAL DAILY
Qty: 90 TABLET | Refills: 3 | Status: SHIPPED | OUTPATIENT
Start: 2020-08-10 | End: 2024-01-04

## 2020-08-10 NOTE — TELEPHONE ENCOUNTER
----- Message from Jeniffer Infante sent at 8/10/2020 10:47 AM CDT -----  Contact: Wife [phone]  Patient would like a refill for losartan (COZAAR) 25 MG tablet for a 90 day supply sent to [facility]. Please advise.

## 2020-08-28 DIAGNOSIS — I25.5 ISCHEMIC DILATED CARDIOMYOPATHY: ICD-10-CM

## 2020-08-28 DIAGNOSIS — I25.118 CORONARY ARTERY DISEASE WITH STABLE ANGINA PECTORIS, UNSPECIFIED VESSEL OR LESION TYPE, UNSPECIFIED WHETHER NATIVE OR TRANSPLANTED HEART: Primary | ICD-10-CM

## 2020-08-28 DIAGNOSIS — I42.0 ISCHEMIC DILATED CARDIOMYOPATHY: ICD-10-CM

## 2020-08-28 RX ORDER — ISOSORBIDE MONONITRATE 60 MG/1
60 TABLET, EXTENDED RELEASE ORAL DAILY
Qty: 90 TABLET | Refills: 4 | Status: SHIPPED | OUTPATIENT
Start: 2020-08-28 | End: 2024-01-04

## 2020-12-28 ENCOUNTER — TELEPHONE (OUTPATIENT)
Dept: CARDIOLOGY | Facility: CLINIC | Age: 60
End: 2020-12-28

## 2020-12-28 DIAGNOSIS — I25.5 ISCHEMIC DILATED CARDIOMYOPATHY: ICD-10-CM

## 2020-12-28 DIAGNOSIS — I10 ESSENTIAL HYPERTENSION: Primary | ICD-10-CM

## 2020-12-28 DIAGNOSIS — E78.5 DYSLIPIDEMIA: ICD-10-CM

## 2020-12-28 DIAGNOSIS — I25.118 CORONARY ARTERY DISEASE WITH STABLE ANGINA PECTORIS, UNSPECIFIED VESSEL OR LESION TYPE, UNSPECIFIED WHETHER NATIVE OR TRANSPLANTED HEART: ICD-10-CM

## 2020-12-28 DIAGNOSIS — I42.0 ISCHEMIC DILATED CARDIOMYOPATHY: ICD-10-CM

## 2020-12-28 NOTE — TELEPHONE ENCOUNTER
Spoke to pt wife, Ms. Eden, and advised that test orders are in the system, pt can schedule tests one week prior to yearly f/u with Dr. Ellington     Pt wife states that pt had lab work done last week, requested that they fax lab results to our office to scan in pt chart

## 2020-12-28 NOTE — TELEPHONE ENCOUNTER
----- Message from Oumou Viera sent at 12/28/2020 11:35 AM CST -----  Regarding: Call back  Contact: [phone]  Patient is calling to talk to nurse in regards to getting lab orders and also an EKG and echo stress test. Please advice

## 2022-10-27 ENCOUNTER — TELEPHONE (OUTPATIENT)
Dept: NEUROLOGY | Facility: CLINIC | Age: 62
End: 2022-10-27
Payer: COMMERCIAL

## 2022-10-27 NOTE — TELEPHONE ENCOUNTER
----- Message from Bisi Collins sent at 10/27/2022  3:46 PM CDT -----  Hector Garcia calling regarding Appointment Access  (message) for CID[facility].   Pt is a former pt of Dr. López and would like to re-establish care.  call back [phone]

## 2022-10-28 NOTE — TELEPHONE ENCOUNTER
Spoke with patient wife and confirmed appt 12/5 @ 2 sent message to Monika to schedule   1 Principal Discharge DX:	SOB (shortness of breath)

## 2022-12-05 ENCOUNTER — OFFICE VISIT (OUTPATIENT)
Dept: NEUROLOGY | Facility: CLINIC | Age: 62
End: 2022-12-05
Payer: COMMERCIAL

## 2022-12-05 DIAGNOSIS — E78.5 DYSLIPIDEMIA: ICD-10-CM

## 2022-12-05 DIAGNOSIS — G61.81 CIDP (CHRONIC INFLAMMATORY DEMYELINATING POLYNEUROPATHY): Primary | ICD-10-CM

## 2022-12-05 DIAGNOSIS — E11.9 TYPE 2 DIABETES MELLITUS WITHOUT COMPLICATION, WITHOUT LONG-TERM CURRENT USE OF INSULIN: Chronic | ICD-10-CM

## 2022-12-05 PROCEDURE — 99214 OFFICE O/P EST MOD 30 MIN: CPT | Mod: S$GLB,,, | Performed by: PSYCHIATRY & NEUROLOGY

## 2022-12-05 PROCEDURE — 99999 PR PBB SHADOW E&M-EST. PATIENT-LVL II: ICD-10-PCS | Mod: PBBFAC,,, | Performed by: PSYCHIATRY & NEUROLOGY

## 2022-12-05 PROCEDURE — 4010F PR ACE/ARB THEARPY RXD/TAKEN: ICD-10-PCS | Mod: CPTII,S$GLB,, | Performed by: PSYCHIATRY & NEUROLOGY

## 2022-12-05 PROCEDURE — 99214 PR OFFICE/OUTPT VISIT, EST, LEVL IV, 30-39 MIN: ICD-10-PCS | Mod: S$GLB,,, | Performed by: PSYCHIATRY & NEUROLOGY

## 2022-12-05 PROCEDURE — 1160F RVW MEDS BY RX/DR IN RCRD: CPT | Mod: CPTII,S$GLB,, | Performed by: PSYCHIATRY & NEUROLOGY

## 2022-12-05 PROCEDURE — 1159F PR MEDICATION LIST DOCUMENTED IN MEDICAL RECORD: ICD-10-PCS | Mod: CPTII,S$GLB,, | Performed by: PSYCHIATRY & NEUROLOGY

## 2022-12-05 PROCEDURE — 99999 PR PBB SHADOW E&M-EST. PATIENT-LVL II: CPT | Mod: PBBFAC,,, | Performed by: PSYCHIATRY & NEUROLOGY

## 2022-12-05 PROCEDURE — 1159F MED LIST DOCD IN RCRD: CPT | Mod: CPTII,S$GLB,, | Performed by: PSYCHIATRY & NEUROLOGY

## 2022-12-05 PROCEDURE — 1160F PR REVIEW ALL MEDS BY PRESCRIBER/CLIN PHARMACIST DOCUMENTED: ICD-10-PCS | Mod: CPTII,S$GLB,, | Performed by: PSYCHIATRY & NEUROLOGY

## 2022-12-05 PROCEDURE — 4010F ACE/ARB THERAPY RXD/TAKEN: CPT | Mod: CPTII,S$GLB,, | Performed by: PSYCHIATRY & NEUROLOGY

## 2022-12-05 NOTE — PROGRESS NOTES
Subjective:     Chief Complaint:  CIDP    History of Present Illness:  I have reviewed all relevant medical records in Epic. Hector Garcia is a 62 y.o. male who presents today for re-establishment of care for CIDP. On chat review the patient was seen by me in 2018 but was then lost to follow up. Ever since then he has been following with and has been managed by Dr Moeller. He is here today stating that he wants to start following with us again. He is currently receiving IVIG 80g q28 days and also carbamazepine 200 qhs for jerky movements of his legs. He states that his main complain from a neurologic standpoint, has always been his legs. 3 months ago starting having back pain. MRI showed disc disease but surgery was not recommended since complicated heart history (multiple Mis SP stents and open heart surgery). Since the back started causing pain, he noticed that his R leg became weaker than his left.     HPI on 2/14/2018:   Symptoms began in April 2017 with troubles in LE with weakness, increased difficulty controlling motor skills, tingling and severe gastrocnemius and foot muscle cramping. UEs have been unaffected. He has noted diminished thigh and calf girth as well as a 20 pound weight loss since then. He still works full time as a  but has been having increasing difficulties standing for prolonged periods of time and his gait is impaired by the end of the day. Her has had work up that produced CSF protein of 94 (90L and 10N) as well as EDX which showed absent LE SNAPs and reduced CMAPs with CVs in the low 30s consistent with demyelination. Results are likely too severe to be explained by DM2 alone. He has had transient elevation of CK in the 600s, but mostly it has been normal. He was initially stopped on statins but that has not improved leg cramping.    Review of Systems  Review of Systems   Constitutional:  Positive for fatigue. Negative for activity change.   HENT:  Negative for congestion.     Eyes:  Negative for visual disturbance.   Respiratory:  Negative for apnea and chest tightness.    Cardiovascular:  Negative for chest pain, palpitations and leg swelling.   Gastrointestinal:  Negative for abdominal distention.   Genitourinary:  Negative for difficulty urinating and dysuria.   Musculoskeletal:  Positive for arthralgias, back pain and gait problem. Negative for joint swelling, myalgias, neck pain and neck stiffness.   Skin:  Negative for color change.   Neurological:  Negative for dizziness.   Psychiatric/Behavioral:  Negative for agitation.       Objective:   There were no vitals filed for this visit.    Neurologic Exam     Mental Status   Oriented to person, place, and time.   Registration: recalls 1 of 3 objects.   Attention: normal. Concentration: normal.   Speech: speech is normal   Level of consciousness: alert  Knowledge: good.     Cranial Nerves     CN II   Visual fields full to confrontation.     CN III, IV, VI   Pupils are equal, round, and reactive to light.    CN V   Facial sensation intact.     CN VII   Facial expression full, symmetric.     CN VIII   CN VIII normal.     CN IX, X   CN IX normal.   CN X normal.     CN XI   CN XI normal.     CN XII   CN XII normal.     Motor Exam   Overall muscle tone: normal    Strength   Right neck flexion: 5/5  Left neck flexion: 5/5  Right neck extension: 5/5  Left neck extension: 5/5  Right deltoid: 5/5  Left deltoid: 5/5  Right biceps: 5/5  Left biceps: 5/5  Right triceps: 5/5  Left triceps: 5/5  Right wrist flexion: 5/5  Left wrist flexion: 5/5  Right wrist extension: 5/5  Left wrist extension: 5/5  Right interossei: 5/5  Left interossei: 5/5  Right abdominals: 5/5  Left abdominals: 5/5  Right iliopsoas: 4/5  Left iliopsoas: 4/5  Right quadriceps: 4/5  Left quadriceps: 4/5  Right hamstrin/5  Left hamstrin/5  Right glutei: 4/5  Left glutei: 4/5  Right anterior tibial: 4/5  Left anterior tibial: 4/5  Right posterior tibial: 4/5  Left  posterior tibial: 4/5  Right peroneal: 4/5  Left peroneal: 4/5  Right gastroc: 4/5  Left gastroc: 4/5    Sensory Exam   Right arm light touch: normal  Left arm light touch: normal  Right leg light touch: normal  Left leg light touch: normal    Gait, Coordination, and Reflexes     Gait  Gait: normal    Tremor   Resting tremor: absent  Intention tremor: absent  Action tremor: absent    Reflexes   Right brachioradialis: 2+  Left brachioradialis: 2+  Right biceps: 2+  Left biceps: 2+  Right triceps: 2+  Left triceps: 2+  Right patellar: 2+  Left patellar: 2+  Right achilles: 2+  Left achilles: 2+  Right : 2+  Left : 2+    Physical Exam  HENT:      Head: Normocephalic and atraumatic.      Right Ear: Tympanic membrane normal.      Left Ear: Tympanic membrane normal.      Mouth/Throat:      Mouth: Mucous membranes are moist.   Eyes:      Extraocular Movements: Extraocular movements intact.      Pupils: Pupils are equal, round, and reactive to light.   Cardiovascular:      Rate and Rhythm: Normal rate and regular rhythm.      Pulses: Normal pulses.      Heart sounds: Normal heart sounds.   Pulmonary:      Effort: Pulmonary effort is normal.   Abdominal:      General: Abdomen is flat.   Musculoskeletal:         General: Normal range of motion.      Cervical back: Normal range of motion.   Neurological:      Mental Status: He is alert and oriented to person, place, and time.      Gait: Gait is intact.      Deep Tendon Reflexes:      Reflex Scores:       Tricep reflexes are 2+ on the right side and 2+ on the left side.       Bicep reflexes are 2+ on the right side and 2+ on the left side.       Brachioradialis reflexes are 2+ on the right side and 2+ on the left side.       Patellar reflexes are 2+ on the right side and 2+ on the left side.       Achilles reflexes are 2+ on the right side and 2+ on the left side.  Psychiatric:         Mood and Affect: Mood normal.         Speech: Speech normal.         Lab Results    Component Value Date    WBC 4.85 11/24/2019    HGB 13.9 (L) 11/24/2019    HCT 40.5 11/24/2019     (L) 11/24/2019    CHOL 146 02/23/2019    TRIG 193 (H) 02/23/2019    HDL 26 (L) 02/23/2019    ALT 18 11/24/2019    AST 18 11/24/2019     11/24/2019    K 3.8 11/24/2019     11/24/2019    CREATININE 1.3 11/24/2019    BUN 16 11/24/2019    CO2 22 (L) 11/24/2019    TSH 2.37 11/21/2017    HGBA1C 6.2 (H) 02/23/2019    [id number] 738 11/21/2017         Assessment and Plan:     Problem List Items Addressed This Visit       CIDP (chronic inflammatory demyelinating polyneuropathy) - Primary    Current Assessment & Plan     Currently managed by Dr. Moeller. He receives IVIg 1g/kg IBW Q4 weeks. Current therapy plan is per Dr. Moeller and is active through July 2023. If still in my care at that time we will resume the IVIg therapy plan at same. He reports good strength and function, denies relapses in strength toward end of the infusion interval, and he takes no steroids.         Relevant Orders    Basic metabolic panel    Type 2 diabetes mellitus without complication (Chronic)    Current Assessment & Plan     On appropriate medications and managed by PCP. We discussed the importance of managing all secondary stroke risk factors, including DM2.           Dyslipidemia    Current Assessment & Plan     On appropriate medications and managed by PCP. We discussed the importance of managing all secondary stroke risk factors, including HLD.            RTC in early summer 2023    DELMA Scott MD  [facility]

## 2022-12-07 ENCOUNTER — PATIENT MESSAGE (OUTPATIENT)
Dept: NEUROLOGY | Facility: CLINIC | Age: 62
End: 2022-12-07
Payer: COMMERCIAL

## 2022-12-28 NOTE — ASSESSMENT & PLAN NOTE
Currently managed by Dr. Moeller. He receives IVIg 1g/kg IBW Q4 weeks. Current therapy plan is per Dr. Moeller and is active through July 2023. If still in my care at that time we will resume the IVIg therapy plan at same. He reports good strength and function, denies relapses in strength toward end of the infusion interval, and he takes no steroids.

## 2022-12-28 NOTE — ASSESSMENT & PLAN NOTE
On appropriate medications and managed by PCP. We discussed the importance of managing all secondary stroke risk factors, including HLD.

## 2023-07-05 ENCOUNTER — OFFICE VISIT (OUTPATIENT)
Dept: NEUROLOGY | Facility: CLINIC | Age: 63
End: 2023-07-05
Payer: COMMERCIAL

## 2023-07-05 VITALS
SYSTOLIC BLOOD PRESSURE: 112 MMHG | HEART RATE: 71 BPM | BODY MASS INDEX: 28.77 KG/M2 | WEIGHT: 224.19 LBS | DIASTOLIC BLOOD PRESSURE: 69 MMHG | HEIGHT: 74 IN

## 2023-07-05 DIAGNOSIS — G61.81 CIDP (CHRONIC INFLAMMATORY DEMYELINATING POLYNEUROPATHY): Primary | ICD-10-CM

## 2023-07-05 DIAGNOSIS — R25.3 MUSCLE TWITCHING: ICD-10-CM

## 2023-07-05 PROCEDURE — 1159F PR MEDICATION LIST DOCUMENTED IN MEDICAL RECORD: ICD-10-PCS | Mod: CPTII,S$GLB,, | Performed by: STUDENT IN AN ORGANIZED HEALTH CARE EDUCATION/TRAINING PROGRAM

## 2023-07-05 PROCEDURE — 99417 PROLNG OP E/M EACH 15 MIN: CPT | Mod: S$GLB,,, | Performed by: STUDENT IN AN ORGANIZED HEALTH CARE EDUCATION/TRAINING PROGRAM

## 2023-07-05 PROCEDURE — 3008F PR BODY MASS INDEX (BMI) DOCUMENTED: ICD-10-PCS | Mod: CPTII,S$GLB,, | Performed by: STUDENT IN AN ORGANIZED HEALTH CARE EDUCATION/TRAINING PROGRAM

## 2023-07-05 PROCEDURE — 3078F DIAST BP <80 MM HG: CPT | Mod: CPTII,S$GLB,, | Performed by: STUDENT IN AN ORGANIZED HEALTH CARE EDUCATION/TRAINING PROGRAM

## 2023-07-05 PROCEDURE — 99215 PR OFFICE/OUTPT VISIT, EST, LEVL V, 40-54 MIN: ICD-10-PCS | Mod: S$GLB,,, | Performed by: STUDENT IN AN ORGANIZED HEALTH CARE EDUCATION/TRAINING PROGRAM

## 2023-07-05 PROCEDURE — 3074F PR MOST RECENT SYSTOLIC BLOOD PRESSURE < 130 MM HG: ICD-10-PCS | Mod: CPTII,S$GLB,, | Performed by: STUDENT IN AN ORGANIZED HEALTH CARE EDUCATION/TRAINING PROGRAM

## 2023-07-05 PROCEDURE — 99215 OFFICE O/P EST HI 40 MIN: CPT | Mod: S$GLB,,, | Performed by: STUDENT IN AN ORGANIZED HEALTH CARE EDUCATION/TRAINING PROGRAM

## 2023-07-05 PROCEDURE — 99417 PR PROLONGED SVC, OUTPT, W/WO DIRECT PT CONTACT,  EA ADDTL 15 MIN: ICD-10-PCS | Mod: S$GLB,,, | Performed by: STUDENT IN AN ORGANIZED HEALTH CARE EDUCATION/TRAINING PROGRAM

## 2023-07-05 PROCEDURE — 3074F SYST BP LT 130 MM HG: CPT | Mod: CPTII,S$GLB,, | Performed by: STUDENT IN AN ORGANIZED HEALTH CARE EDUCATION/TRAINING PROGRAM

## 2023-07-05 PROCEDURE — 3008F BODY MASS INDEX DOCD: CPT | Mod: CPTII,S$GLB,, | Performed by: STUDENT IN AN ORGANIZED HEALTH CARE EDUCATION/TRAINING PROGRAM

## 2023-07-05 PROCEDURE — 99999 PR PBB SHADOW E&M-EST. PATIENT-LVL IV: ICD-10-PCS | Mod: PBBFAC,,, | Performed by: STUDENT IN AN ORGANIZED HEALTH CARE EDUCATION/TRAINING PROGRAM

## 2023-07-05 PROCEDURE — 3078F PR MOST RECENT DIASTOLIC BLOOD PRESSURE < 80 MM HG: ICD-10-PCS | Mod: CPTII,S$GLB,, | Performed by: STUDENT IN AN ORGANIZED HEALTH CARE EDUCATION/TRAINING PROGRAM

## 2023-07-05 PROCEDURE — 99999 PR PBB SHADOW E&M-EST. PATIENT-LVL IV: CPT | Mod: PBBFAC,,, | Performed by: STUDENT IN AN ORGANIZED HEALTH CARE EDUCATION/TRAINING PROGRAM

## 2023-07-05 PROCEDURE — 1159F MED LIST DOCD IN RCRD: CPT | Mod: CPTII,S$GLB,, | Performed by: STUDENT IN AN ORGANIZED HEALTH CARE EDUCATION/TRAINING PROGRAM

## 2023-07-05 RX ORDER — MV-MIN/FOLIC/VIT K/LYCOP/COQ10 200-100MCG
CAPSULE ORAL
COMMUNITY

## 2023-07-05 RX ORDER — METOPROLOL TARTRATE 25 MG/1
12.5 TABLET, FILM COATED ORAL 2 TIMES DAILY
COMMUNITY
Start: 2023-03-16

## 2023-07-05 RX ORDER — CARBAMAZEPINE 100 MG/1
200 TABLET, EXTENDED RELEASE ORAL 2 TIMES DAILY
Qty: 360 TABLET | Refills: 1 | Status: SHIPPED | OUTPATIENT
Start: 2023-07-05 | End: 2024-01-04 | Stop reason: SDUPTHER

## 2023-07-05 NOTE — PROGRESS NOTES
Chief Complaint      Chief Complaint   Patient presents with    CIPD    Establish Care       History of Present Illness     Mr. Garcia is a 60 y/o gentleman with a PMH of CAD s/p multiple stents, defibrillator placement in 2017, currently on DAPT; HLD and pre-DM who presents for f/u of his CIDP which was first diagnosed in May 2018. At his visit in July 2020, his carbamazepine dose was increased from 100 mg BID to 200 mg BID. He has had complicated hospital course this July for kidney stone. This was surgically removed and blood clx was positive for bacteriodes. Unfortunately, he is overdue for his IVIG, which was briefly stopped per ID as he is still on IV abx until September. He now feels weaker, especially getting out of chairs and experiencing increased paresthesias in his arms as well.  He also had recent CABG which he has tolerated well. Some mild SOB but otherwise doing well. No recent fevers or signs of infection. There was concern that his source of sepsis might be port vs defibrillator leads, though neither has been replaced at this given time.     Interval Hx 12/7/21 (seen as a fellow)    Last office visit on 8/3/21. Since then he completed antibiotics treatment and his cardiac issues worsened with ED decreasing from 40% to 25-30%. States ID cleared him to use his port. Does endorse weakness mostly in legs but he is not sure if weakness/fatigue is related to his cardiac problems, prolonged hospital stay with deconditioning or his CIDP. No recent falls. Taking CBZ with no SEs.    Interval Hx 3/8/22 (seen as a fellow)  Restarted on full dose IVIG without any complaints. In terms of EF, has not been retested for EF but follow up with cardiologist in one month but finishes cardiac rehab in May 2022. Started Entresto in December.   No new infections, doing well.  Denies any new weakness, does continue to have gait instability when turning corners. Continues to take carbapamazine with no SEs.     Interval  Hx 6/7/22 (seen as a fellow)  Finished cardiac rehab in May 2022. Doing well, getting monthly full dose IVIG, next dose 6/24/22, still on Entresto and following with cardiology.  No issues with access, has a port on right upper chest.   No new weakness or complaints today, doing well.   Continues to take carbamazepine for his nightly muscle twitching, takes 200mg (2 pills of 50mg) at night.     7/7/23 - here today to establish care  Due for home IVIG on July 22, 23. No new weakness, no noted wearing off period.  Continues to take carbamazepine for his nightly muscle twitching, takes 200mg (2 pills of 50mg) at night.       Review of patient's allergies indicates:   Allergen Reactions    Penicillins     Statins-hmg-coa reductase inhibitors Other (See Comments)     Dizziness and muscle cramps.     Current Outpatient Medications   Medication Sig Dispense Refill    aspirin (ECOTRIN) 81 MG EC tablet Take 1 tablet (81 mg total) by mouth once daily. 30 tablet 12    DEXLANSOPRAZOLE (DEXILANT ORAL) Take 60 mg by mouth Daily.      immune globulin,hum,,capr,IgG,, GAMUNEX-C/GAMMAKED, 5 gram/50 mL (10 %) Soln Inject 50 mg/kg into the vein.      metFORMIN (GLUCOPHAGE-XR) 500 MG 24 hr tablet Take 500 mg by mouth daily with breakfast.      metoprolol tartrate (LOPRESSOR) 25 MG tablet Take 12.5 mg by mouth 2 (two) times daily.      omega 3-dha-epa-fish oil (OMEGA-3) 350 mg-235 mg- 90 mg-597 mg CpDR       ranolazine 500 mg PSRG       albuterol sulfate (PROAIR RESPICLICK) 90 mcg/actuation AePB Inhale 2 puffs into the lungs every 4 (four) hours. Rescue 1 each 2    carBAMazepine (TEGRETOL XR) 100 MG 12 hr tablet Take 2 tablets (200 mg total) by mouth 2 (two) times daily. 360 tablet 1    carvedilol PHOSPHATE 10 MG ORAL CM24 (COREG CR) 10 MG CM24 TAKE 1 CAPSULE DAILY 90 capsule 3    clindamycin (CLEOCIN) 300 MG capsule       collagenase (SANTYL) ointment Apply topically once daily. 250 g 2    isosorbide mononitrate (IMDUR) 60 MG 24 hr  tablet Take 1 tablet (60 mg total) by mouth once daily. 90 tablet 4    losartan (COZAAR) 25 MG tablet Take 1 tablet (25 mg total) by mouth once daily. 90 tablet 3    nitroGLYCERIN (NITROSTAT) 0.4 MG SL tablet Place 1 tablet (0.4 mg total) under the tongue every 5 (five) minutes as needed for Chest pain. 20 tablet 12     No current facility-administered medications for this visit.       Medical History     Past Medical History:   Diagnosis Date    AMI (acute mesenteric ischemia)     Asthma     Coronary artery disease     Essential hypertension 2/14/2018    GERD (gastroesophageal reflux disease)     Neuromuscular disorder     Type 2 diabetes mellitus without complication 5/28/2018     Past Surgical History:   Procedure Laterality Date    CARDIAC DEFIBRILLATOR PLACEMENT      CARDIAC DEFIBRILLATOR PLACEMENT Left 03/01/2017    CORONARY STENT PLACEMENT      TONSILLECTOMY       No family history on file.  Social History     Socioeconomic History    Marital status:    Tobacco Use    Smoking status: Every Day     Packs/day: 0.50     Years: 50.00     Pack years: 25.00     Types: Cigarettes    Smokeless tobacco: Never   Substance and Sexual Activity    Alcohol use: No    Drug use: No    Sexual activity: Yes     Partners: Female       Exam     Vitals:    07/05/23 0728   BP: 112/69   Pulse: 71      Neurological Examination:    GEN: This is a well-developed, well-nourished adult man in [facility].     MSE: Awake, alert, attentive, and oriented x 4. Fluent speech with normal repetition, naming, and comprehension.   There is no extinction to tactile or visual stimulus. Good fund of knowledge. Recent and remote memory are intact.   Affect is bright, euthymic, and appropriate.     CN: Pupils are equal, round, and reactive to light. Visual fields are full to confrontation. There is no papilledema on   fundoscopic exam. Extraocular movements are intact without nystagmus. Sensation is equal to light touch bilaterally.   There is no  dysarthria. There is no facial asymmetry. Hearing is equal to tuning fork bilaterally. Tongue and palate are   midline. SCMs and shoulder shrug are 5/5 bilaterally    MOTOR: Normal tone and bulk. Initially Strength 4+/5 in proximal extremities in bilateral hip flexors; 5/5 otherwise. There is no clonus. There is no pronator drift. No hypokinesia   or bradykinesia. No adventitious movements were noted.     SENSORY: Intact to light touch, temperature, and vibration throughout.     COORD: No dysmetria on finger-to-nose, finger-nose-finger, or heel-to-shin. There is no dysdiadochokinesia on rapid alternating movements.     GAIT: The patient is able to arise from a chair without assistance. Gait is normal including stance, posture, stride length,   armswing, and pivot. The patient is able to perform toe, heel, and tandem gait without difficulties.    REFLEXES: Muscle stretch reflexes are 2+ and symmetric throughout. Plantar responses are flexor bilaterally    EXT: Pulses are 2+. There is no lower extremity edema. There are no skin changes noted.     Labs and Imaging     MRI / Imaging results - no new results    EMG 08/2018 - predominantly motor multi focal polyneuropathy with both demyelinating and axonal features, consistent with CIDP.     Assessment and Plan     Problem List Items Addressed This Visit    None  Visit Diagnoses       Muscle twitching    -  Primary    Relevant Medications    carBAMazepine (TEGRETOL XR) 100 MG 12 hr tablet          Mr. Garcia is a 61 y/o gentleman who presents for f/u of his CIDP which was first diagnosed in May 2018.     Patient was seen by me when I was a fellow, here to establish care with me.    IVIG was temporarily stopped in Nov 2021 with concurrent infections and worsening cardiac EF from 40% to 25-30%, however restarted half dose of IVIG in December 2021 and then has had full treatments since. Finished cardiac rehab, doing well and following up with cardiology to recheck his  cardiac ejection fraction.     Continues to have proximal weakness in hips 4/5, next IVIG is due July 22 and 23, 2023    - continue monthly IVIG through R chest port - getting 1g/kg split over 2 days  - Premeds prior to infusion - acetaminophen 325 - 650mg PO for headache / fever. Diphenhydramine capsules 25-50mg PO    Follow-up: scheduled for 10/5/23    Time spent on this encounter: 75 minutes. This includes face to face time and non-face to face time preparing to see the patient (eg, review of tests), obtaining and/or reviewing separately obtained history, documenting clinical information in the electronic or other health record, independently interpreting results and communicating results to the patient/family/caregiver, or care coordinator.   Extensive time spent coordinating care with home infusion company to maintain home IVIG monthly for treatment of his CIDP.

## 2023-07-07 PROBLEM — R25.3 MUSCLE TWITCHING: Status: ACTIVE | Noted: 2023-07-07

## 2023-07-18 ENCOUNTER — TELEPHONE (OUTPATIENT)
Dept: NEUROLOGY | Facility: CLINIC | Age: 63
End: 2023-07-18
Payer: COMMERCIAL

## 2023-07-18 DIAGNOSIS — G61.81 CIDP (CHRONIC INFLAMMATORY DEMYELINATING POLYNEUROPATHY): Primary | ICD-10-CM

## 2023-10-05 ENCOUNTER — TELEPHONE (OUTPATIENT)
Dept: NEUROLOGY | Facility: CLINIC | Age: 63
End: 2023-10-05
Payer: COMMERCIAL

## 2023-10-05 NOTE — TELEPHONE ENCOUNTER
Called pt to cancel his appt due to Dr. Guevara being unavailable and to let him know she placed a [place] [facility] [place]af will be reaching out to him soon to schedule. Pt verbalized undrstanding.

## 2023-10-12 ENCOUNTER — TELEPHONE (OUTPATIENT)
Dept: NEUROLOGY | Facility: CLINIC | Age: 63
End: 2023-10-12
Payer: COMMERCIAL

## 2023-10-12 NOTE — TELEPHONE ENCOUNTER
----- Message from Yazmin Brush MA sent at 10/11/2023 10:02 AM CDT -----  Regarding: FW: appt access  Contact: Karey   [phone]  Good morning please advised pt thank you.  ----- Message -----  From: Diana Little  Sent: 10/11/2023   9:58 AM CDT  To: [facility] Staff  Subject: appt access                                      Pt wife Karey is calling to speak with someone in provider office in regards to getting the pt scheduled for a f/u with the provider for CIDP (chronic inflammatory demyelinating polyneuropathy Karey  is asking for a return call please call her  at  [phone]

## 2023-10-25 NOTE — PROGRESS NOTES
Subjective:     Chief Complaint:  CIDP    Interval History 10/26/2023    I have reviewed all relevant history in Epic. The patient presents for routine follow up regarding CIDP, not seen in my clinic for a few years. He is currently managed with IVIg 100g over 2 days (50g per day) [id number]. No Prednisone (difficult to control DM2). He was previously being managed by Dr. Guevara, and prior to the Dr. Moeller,  however was referred back to [facility]. He has noticed that he is having increased falls and imbalance recently. Per the patients wife he began to steadily decline following a covid vaccine administration in August. Immediately following his infusions he finds that he is very somnolent and requires a lot of rest. He is not receiving NS piggyback.         History of Present Illness:  I have reviewed all relevant medical records in Epic. Hector Garcia is a 63 y.o. male who presents today for re-establishment of care for CIDP. On chat review the patient was seen by me in 2018 but was then lost to follow up. Ever since then he has been following with and has been managed by Dr Moeller. He is here today stating that he wants to start following with us again. He is currently receiving IVIG 80g q28 days and also carbamazepine 200 qhs for jerky movements of his legs. He states that his main complain from a neurologic standpoint, has always been his legs. 3 months ago starting having back pain. MRI showed disc disease but surgery was not recommended since complicated heart history (multiple Mis SP stents and open heart surgery). Since the back started causing pain, he noticed that his R leg became weaker than his left.     HPI on 2/14/2018:   Symptoms began in April 2017 with troubles in LE with weakness, increased difficulty controlling motor skills, tingling and severe gastrocnemius and foot muscle cramping. UEs have been unaffected. He has noted diminished thigh and calf girth as well as a 20 pound weight loss  "since then. He still works full time as a  but has been having increasing difficulties standing for prolonged periods of time and his gait is impaired by the end of the day. Her has had work up that produced CSF protein of 94 (90L and 10N) as well as EDX which showed absent LE SNAPs and reduced CMAPs with CVs in the low 30s consistent with demyelination. Results are likely too severe to be explained by DM2 alone. He has had transient elevation of CK in the 600s, but mostly it has been normal. He was initially stopped on statins but that has not improved leg cramping.    Review of Systems  Review of Systems   Constitutional:  Positive for fatigue. Negative for activity change.   HENT:  Negative for congestion.    Eyes:  Negative for visual disturbance.   Respiratory:  Negative for apnea and chest tightness.    Cardiovascular:  Negative for chest pain, palpitations and leg swelling.   Gastrointestinal:  Negative for abdominal distention.   Genitourinary:  Negative for difficulty urinating and dysuria.   Musculoskeletal:  Positive for arthralgias, back pain and gait problem. Negative for joint swelling, myalgias, neck pain and neck stiffness.   Skin:  Negative for color change.   Neurological:  Negative for dizziness.   Psychiatric/Behavioral:  Negative for agitation.         Objective:     Vitals:    10/26/23 1524   BP: 118/78   Pulse: 73   Weight: 99 kg (218 lb 4.1 oz)   Height: 6' 3" (1.905 m)   PainSc: 0-No pain       Neurologic Exam     Mental Status   Oriented to person, place, and time.   Registration: recalls 1 of 3 objects.   Attention: normal. Concentration: normal.   Speech: speech is normal   Level of consciousness: alert  Knowledge: good.     Cranial Nerves     CN II   Visual fields full to confrontation.     CN III, IV, VI   Pupils are equal, round, and reactive to light.    CN V   Facial sensation intact.     CN VII   Facial expression full, symmetric.     CN VIII   CN VIII normal.     CN " IX, X   CN IX normal.   CN X normal.     CN XI   CN XI normal.     CN XII   CN XII normal.     Motor Exam   Overall muscle tone: normal    Strength   Strength 5/5 except as noted. Left hip flexion 4+/5  Left Knee Extension 4/5         Sensory Exam   Right arm light touch: normal  Left arm light touch: normal  Right leg light touch: normal  Left leg light touch: normal    Gait, Coordination, and Reflexes     Gait  Gait: normal    Tremor   Resting tremor: absent  Intention tremor: absent  Action tremor: absent    Reflexes   Right brachioradialis: 2+  Left brachioradialis: 2+  Right biceps: 2+  Left biceps: 2+  Right triceps: 2+  Left triceps: 2+  Right patellar: 2+  Left patellar: 2+  Right achilles: 2+  Left achilles: 2+  Right : 2+  Left : 2+      Physical Exam  HENT:      Head: Normocephalic and atraumatic.      Right Ear: Tympanic membrane normal.      Left Ear: Tympanic membrane normal.      Mouth/Throat:      Mouth: Mucous membranes are moist.   Eyes:      Extraocular Movements: Extraocular movements intact.      Pupils: Pupils are equal, round, and reactive to light.   Cardiovascular:      Rate and Rhythm: Normal rate and regular rhythm.      Pulses: Normal pulses.      Heart sounds: Normal heart sounds.   Pulmonary:      Effort: Pulmonary effort is normal.   Abdominal:      General: Abdomen is flat.   Musculoskeletal:         General: Normal range of motion.      Cervical back: Normal range of motion.   Neurological:      Mental Status: He is alert and oriented to person, place, and time.      Gait: Gait is intact.      Deep Tendon Reflexes:      Reflex Scores:       Tricep reflexes are 2+ on the right side and 2+ on the left side.       Bicep reflexes are 2+ on the right side and 2+ on the left side.       Brachioradialis reflexes are 2+ on the right side and 2+ on the left side.       Patellar reflexes are 2+ on the right side and 2+ on the left side.       Achilles reflexes are 2+ on the right side  and 2+ on the left side.  Psychiatric:         Mood and Affect: Mood normal.         Speech: Speech normal.           Lab Results   Component Value Date    WBC  (A) 07/03/2021    HGB 13.9 (L) 11/24/2019    HCT 40.5 11/24/2019     (L) 11/24/2019    CHOL 146 02/23/2019    TRIG 193 (H) 02/23/2019    HDL 26 (L) 02/23/2019    ALT 13 01/18/2023    AST 20 01/18/2023     (L) 01/18/2023    K 3.9 01/18/2023     11/24/2019    CREATININE 1.19 01/18/2023    BUN 14.3 01/18/2023    CO2 26 01/18/2023    TSH 2.37 11/21/2017    HGBA1C 6.2 (H) 02/23/2019    [id number] 11/21/2017         Assessment and Plan:     Problem List Items Addressed This Visit       CIDP (chronic inflammatory demyelinating polyneuropathy)    Overview     Reciving home infusions via [facility]     50g per day for 2 days every 4 weeks    Previously tried and failed prednisone 2/2 elevated A1C.          Current Assessment & Plan     In depth discussion with the patient regarding modification options which can be made to his current therapy regimen due to the patient reported increase in symptoms.     Options would include increasing frequency of IVIg infusions, adding prednisone daily, or adding on immunosuppressant medication.     Due to the patients borderline A1C level as well as cardiac disease noted do not recommend the patient add on prednisone    Discussed with the patient that he can also have some exacerbations 2/2 heat as this past summer has been significantly hotter than previous and many autoimmune patients are suffering as a result.      - Will add 1/2 liter of normal saline piggyback to the patients IVIg in order to prevent kidney damage.   - Increase IVIg frequency from Q4 weeks to Q3 weeks.          Type 2 diabetes mellitus without complication (Chronic)    Current Assessment & Plan     Patients diabetes mellitus type 2 status puts them at an increased risk for neuropathy, stroke, heart attack, and atherosclerosis were  discussed with the patient. They voiced understanding. Patient is currently being managed by PCP for DM2. Discussed importance of maintaining adequate control of sugars in order to prevent further disease progression.            Dyslipidemia - Primary    Current Assessment & Plan     On appropriate medications and managed by PCP. We discussed the importance of managing all secondary stroke risk factors, including hyperlipidemia.           Essential hypertension    Current Assessment & Plan     Discussed with patient importance of management of hypertension due to secondary stroke risk. Patient is on appropriate therapy currently managed by PCP.               RTC in spring 2024    A total of 60 minutes was spent on this encounter and included charts and records review from other medical providers, imaging and diagnostic testing results review, patient interview and examination and discussion, documentation, and discussion of planning with the Neuromuscular Team and Infusions Coordinator.    DELMA Scott MD  [facility]

## 2023-10-26 ENCOUNTER — OFFICE VISIT (OUTPATIENT)
Dept: NEUROLOGY | Facility: CLINIC | Age: 63
End: 2023-10-26
Payer: COMMERCIAL

## 2023-10-26 VITALS
WEIGHT: 218.25 LBS | SYSTOLIC BLOOD PRESSURE: 118 MMHG | HEART RATE: 73 BPM | HEIGHT: 75 IN | DIASTOLIC BLOOD PRESSURE: 78 MMHG | BODY MASS INDEX: 27.14 KG/M2

## 2023-10-26 DIAGNOSIS — G61.81 CIDP (CHRONIC INFLAMMATORY DEMYELINATING POLYNEUROPATHY): ICD-10-CM

## 2023-10-26 DIAGNOSIS — E78.5 DYSLIPIDEMIA: Primary | ICD-10-CM

## 2023-10-26 DIAGNOSIS — I10 ESSENTIAL HYPERTENSION: ICD-10-CM

## 2023-10-26 DIAGNOSIS — E11.9 TYPE 2 DIABETES MELLITUS WITHOUT COMPLICATION, WITHOUT LONG-TERM CURRENT USE OF INSULIN: Chronic | ICD-10-CM

## 2023-10-26 PROCEDURE — 3074F SYST BP LT 130 MM HG: CPT | Mod: CPTII,S$GLB,, | Performed by: PSYCHIATRY & NEUROLOGY

## 2023-10-26 PROCEDURE — 1160F RVW MEDS BY RX/DR IN RCRD: CPT | Mod: CPTII,S$GLB,, | Performed by: PSYCHIATRY & NEUROLOGY

## 2023-10-26 PROCEDURE — 99999 PR PBB SHADOW E&M-EST. PATIENT-LVL III: CPT | Mod: PBBFAC,,, | Performed by: PSYCHIATRY & NEUROLOGY

## 2023-10-26 PROCEDURE — 3288F PR FALLS RISK ASSESSMENT DOCUMENTED: ICD-10-PCS | Mod: CPTII,S$GLB,, | Performed by: PSYCHIATRY & NEUROLOGY

## 2023-10-26 PROCEDURE — 3288F FALL RISK ASSESSMENT DOCD: CPT | Mod: CPTII,S$GLB,, | Performed by: PSYCHIATRY & NEUROLOGY

## 2023-10-26 PROCEDURE — 99999 PR PBB SHADOW E&M-EST. PATIENT-LVL III: ICD-10-PCS | Mod: PBBFAC,,, | Performed by: PSYCHIATRY & NEUROLOGY

## 2023-10-26 PROCEDURE — 3008F PR BODY MASS INDEX (BMI) DOCUMENTED: ICD-10-PCS | Mod: CPTII,S$GLB,, | Performed by: PSYCHIATRY & NEUROLOGY

## 2023-10-26 PROCEDURE — 3078F DIAST BP <80 MM HG: CPT | Mod: CPTII,S$GLB,, | Performed by: PSYCHIATRY & NEUROLOGY

## 2023-10-26 PROCEDURE — 1159F PR MEDICATION LIST DOCUMENTED IN MEDICAL RECORD: ICD-10-PCS | Mod: CPTII,S$GLB,, | Performed by: PSYCHIATRY & NEUROLOGY

## 2023-10-26 PROCEDURE — 3078F PR MOST RECENT DIASTOLIC BLOOD PRESSURE < 80 MM HG: ICD-10-PCS | Mod: CPTII,S$GLB,, | Performed by: PSYCHIATRY & NEUROLOGY

## 2023-10-26 PROCEDURE — 3074F PR MOST RECENT SYSTOLIC BLOOD PRESSURE < 130 MM HG: ICD-10-PCS | Mod: CPTII,S$GLB,, | Performed by: PSYCHIATRY & NEUROLOGY

## 2023-10-26 PROCEDURE — 99215 PR OFFICE/OUTPT VISIT, EST, LEVL V, 40-54 MIN: ICD-10-PCS | Mod: S$GLB,,, | Performed by: PSYCHIATRY & NEUROLOGY

## 2023-10-26 PROCEDURE — 1159F MED LIST DOCD IN RCRD: CPT | Mod: CPTII,S$GLB,, | Performed by: PSYCHIATRY & NEUROLOGY

## 2023-10-26 PROCEDURE — 1101F PT FALLS ASSESS-DOCD LE1/YR: CPT | Mod: CPTII,S$GLB,, | Performed by: PSYCHIATRY & NEUROLOGY

## 2023-10-26 PROCEDURE — 99215 OFFICE O/P EST HI 40 MIN: CPT | Mod: S$GLB,,, | Performed by: PSYCHIATRY & NEUROLOGY

## 2023-10-26 PROCEDURE — 3008F BODY MASS INDEX DOCD: CPT | Mod: CPTII,S$GLB,, | Performed by: PSYCHIATRY & NEUROLOGY

## 2023-10-26 PROCEDURE — 1160F PR REVIEW ALL MEDS BY PRESCRIBER/CLIN PHARMACIST DOCUMENTED: ICD-10-PCS | Mod: CPTII,S$GLB,, | Performed by: PSYCHIATRY & NEUROLOGY

## 2023-10-26 PROCEDURE — 1101F PR PT FALLS ASSESS DOC 0-1 FALLS W/OUT INJ PAST YR: ICD-10-PCS | Mod: CPTII,S$GLB,, | Performed by: PSYCHIATRY & NEUROLOGY

## 2023-10-26 NOTE — ASSESSMENT & PLAN NOTE
Patients diabetes mellitus type 2 status puts them at an increased risk for neuropathy, stroke, heart attack, and atherosclerosis were discussed with the patient. They voiced understanding. Patient is currently being managed by PCP for DM2. Discussed importance of maintaining adequate control of sugars in order to prevent further disease progression.

## 2023-10-26 NOTE — ASSESSMENT & PLAN NOTE
In depth discussion with the patient regarding modification options which can be made to his current therapy regimen due to the patient reported increase in symptoms.     Options would include increasing frequency of IVIg infusions, adding prednisone daily, or adding on immunosuppressant medication.     Due to the patients borderline A1C level as well as cardiac disease noted do not recommend the patient add on prednisone    Discussed with the patient that he can also have some exacerbations 2/2 heat as this past summer has been significantly hotter than previous and many autoimmune patients are suffering as a result.      - Will add 1/2 liter of normal saline piggyback to the patients IVIg in order to prevent kidney damage.   - Increase IVIg frequency from Q4 weeks to Q3 weeks.

## 2023-10-30 ENCOUNTER — TELEPHONE (OUTPATIENT)
Dept: NEUROLOGY | Facility: CLINIC | Age: 63
End: 2023-10-30
Payer: COMMERCIAL

## 2023-10-30 NOTE — TELEPHONE ENCOUNTER
----- Message from Yazmin Brush MA sent at 10/30/2023  3:10 PM CDT -----  Regarding: FW: Pharmacy Call Back  Please advised thank you ,  ----- Message -----  From: Karen Fuentes MA  Sent: 10/30/2023   3:06 PM CDT  To: Rene Sweet Staff  Subject: FW: Pharmacy Call Back                             ----- Message -----  From: Monserrat Rea  Sent: 10/30/2023  11:29 AM CDT  To: Paola CORDOVA Staff  Subject: Pharmacy Call Back                               Type:  Pharmacy Calling to Clarify an RX    Name of Caller: Lauren     Pharmacy Name: [facility]     What do they need to clarify? Asked to clarify that patient needs hydration added to his regimen and has questions about his frequency.       Can you be contacted via [facility]? No     Best Call Back Number:  [phone]

## 2023-10-31 ENCOUNTER — TELEPHONE (OUTPATIENT)
Dept: NEUROLOGY | Facility: CLINIC | Age: 63
End: 2023-10-31
Payer: COMMERCIAL

## 2023-10-31 NOTE — TELEPHONE ENCOUNTER
----- Message from Yazmin Brush MA sent at 10/31/2023 10:26 AM CDT -----  Regarding: FW: Lauren with  [facility]    ----- Message -----  From: Karen Fuentes MA  Sent: 10/31/2023  10:15 AM CDT  To: Rene Sweet Staff  Subject: FW: Lauren with  [facility]       ----- Message -----  From: Lee Lorenzo  Sent: 10/31/2023   9:57 AM CDT  To: Paola CORDOVA Staff  Subject: Lauren with  [facility]         Name of Caller: Lauren     Pharmacy Name: [facility]     Prescription Name: immune globulin,hum,,capr,IgG,, GAMUNEX-C/GAMMAKED, 5 gram/50 mL (10 %) Soln    What do they need to clarify? Need to know if there is going to be a change in the frequency from every 4-3 weeks and also is there going to be a hydration order.       Can you be contacted via [facility]? No     Best Call Back Number:   [facility]   [address] [place], [place] [address] [phone]        Additional Information: Please contact the pharmacy as soon as possible

## 2023-11-03 ENCOUNTER — TELEPHONE (OUTPATIENT)
Dept: NEUROLOGY | Facility: CLINIC | Age: 63
End: 2023-11-03
Payer: COMMERCIAL

## 2023-11-03 ENCOUNTER — PATIENT MESSAGE (OUTPATIENT)
Dept: NEUROLOGY | Facility: CLINIC | Age: 63
End: 2023-11-03
Payer: COMMERCIAL

## 2023-11-03 NOTE — TELEPHONE ENCOUNTER
Most recent chart notes indicated the following.                 - Will add 1/2 liter of normal saline piggyback to the patients IVIg in order to prevent kidney damage.              - Increase IVIg frequency from Q4 weeks to Q3 weeks.      Message sent to Dr. López regarding orders placed. RN Coordinator to follow up once received.       Dalia Rivas RN, BSN, BS  [facility] Clinical Care Coordinator  [phone]

## 2023-11-14 ENCOUNTER — TELEPHONE (OUTPATIENT)
Dept: NEUROLOGY | Facility: HOSPITAL | Age: 63
End: 2023-11-14
Payer: COMMERCIAL

## 2023-11-24 ENCOUNTER — TELEPHONE (OUTPATIENT)
Dept: NEUROLOGY | Facility: CLINIC | Age: 63
End: 2023-11-24

## 2023-11-24 NOTE — TELEPHONE ENCOUNTER
----- Message from Rubina Hicks sent at 11/24/2023  8:52 AM CST -----  Regarding: Consult/Advice  Contact: Hilda @ [phone]  Hilda with [facility] [place] is calling in needing to speak someone in the office in regards to pts hydration before IVIG and increasing the frequency from every 4 weeks to every 3 weeks. Please call Hilda to discuss further

## 2023-12-13 ENCOUNTER — PATIENT MESSAGE (OUTPATIENT)
Dept: NEUROLOGY | Facility: CLINIC | Age: 63
End: 2023-12-13
Payer: COMMERCIAL

## 2023-12-14 ENCOUNTER — PATIENT MESSAGE (OUTPATIENT)
Dept: NEUROLOGY | Facility: CLINIC | Age: 63
End: 2023-12-14
Payer: COMMERCIAL

## 2023-12-19 ENCOUNTER — TELEPHONE (OUTPATIENT)
Dept: NEUROLOGY | Facility: CLINIC | Age: 63
End: 2023-12-19
Payer: COMMERCIAL

## 2023-12-21 ENCOUNTER — PATIENT MESSAGE (OUTPATIENT)
Dept: NEUROLOGY | Facility: CLINIC | Age: 63
End: 2023-12-21
Payer: COMMERCIAL

## 2024-01-02 ENCOUNTER — TELEPHONE (OUTPATIENT)
Dept: NEUROLOGY | Facility: CLINIC | Age: 64
End: 2024-01-02
Payer: COMMERCIAL

## 2024-01-02 NOTE — TELEPHONE ENCOUNTER
Spoke with the patient's wife, Karey. Moved appointment with Dr López 1/4 from 1:40 pm to 2:40 pm. Pt accepted changed time, verbalized understanding, and repeated back date/time/location of scheduled appointment.

## 2024-01-04 ENCOUNTER — OFFICE VISIT (OUTPATIENT)
Dept: NEUROLOGY | Facility: CLINIC | Age: 64
End: 2024-01-04
Payer: COMMERCIAL

## 2024-01-04 VITALS
HEIGHT: 75 IN | HEART RATE: 74 BPM | BODY MASS INDEX: 27.28 KG/M2 | SYSTOLIC BLOOD PRESSURE: 100 MMHG | DIASTOLIC BLOOD PRESSURE: 71 MMHG

## 2024-01-04 DIAGNOSIS — R25.3 MUSCLE TWITCHING: ICD-10-CM

## 2024-01-04 DIAGNOSIS — I25.118 CORONARY ARTERY DISEASE WITH STABLE ANGINA PECTORIS, UNSPECIFIED VESSEL OR LESION TYPE, UNSPECIFIED WHETHER NATIVE OR TRANSPLANTED HEART: ICD-10-CM

## 2024-01-04 DIAGNOSIS — E78.5 DYSLIPIDEMIA: ICD-10-CM

## 2024-01-04 DIAGNOSIS — R20.2 PARESTHESIA OF BOTH LEGS: ICD-10-CM

## 2024-01-04 DIAGNOSIS — I10 ESSENTIAL HYPERTENSION: Primary | ICD-10-CM

## 2024-01-04 DIAGNOSIS — G61.81 CIDP (CHRONIC INFLAMMATORY DEMYELINATING POLYNEUROPATHY): ICD-10-CM

## 2024-01-04 DIAGNOSIS — E11.9 TYPE 2 DIABETES MELLITUS WITHOUT COMPLICATION, WITHOUT LONG-TERM CURRENT USE OF INSULIN: Chronic | ICD-10-CM

## 2024-01-04 PROCEDURE — 4010F ACE/ARB THERAPY RXD/TAKEN: CPT | Mod: CPTII,S$GLB,, | Performed by: PSYCHIATRY & NEUROLOGY

## 2024-01-04 PROCEDURE — 1101F PT FALLS ASSESS-DOCD LE1/YR: CPT | Mod: CPTII,S$GLB,, | Performed by: PSYCHIATRY & NEUROLOGY

## 2024-01-04 PROCEDURE — 1125F AMNT PAIN NOTED PAIN PRSNT: CPT | Mod: CPTII,S$GLB,, | Performed by: PSYCHIATRY & NEUROLOGY

## 2024-01-04 PROCEDURE — 3008F BODY MASS INDEX DOCD: CPT | Mod: CPTII,S$GLB,, | Performed by: PSYCHIATRY & NEUROLOGY

## 2024-01-04 PROCEDURE — 3288F FALL RISK ASSESSMENT DOCD: CPT | Mod: CPTII,S$GLB,, | Performed by: PSYCHIATRY & NEUROLOGY

## 2024-01-04 PROCEDURE — 99999 PR PBB SHADOW E&M-EST. PATIENT-LVL III: CPT | Mod: PBBFAC,,, | Performed by: PSYCHIATRY & NEUROLOGY

## 2024-01-04 PROCEDURE — 3074F SYST BP LT 130 MM HG: CPT | Mod: CPTII,S$GLB,, | Performed by: PSYCHIATRY & NEUROLOGY

## 2024-01-04 PROCEDURE — 1159F MED LIST DOCD IN RCRD: CPT | Mod: CPTII,S$GLB,, | Performed by: PSYCHIATRY & NEUROLOGY

## 2024-01-04 PROCEDURE — 3078F DIAST BP <80 MM HG: CPT | Mod: CPTII,S$GLB,, | Performed by: PSYCHIATRY & NEUROLOGY

## 2024-01-04 PROCEDURE — 99215 OFFICE O/P EST HI 40 MIN: CPT | Mod: S$GLB,,, | Performed by: PSYCHIATRY & NEUROLOGY

## 2024-01-04 RX ORDER — FUROSEMIDE 40 MG/1
40 TABLET ORAL DAILY PRN
COMMUNITY

## 2024-01-04 RX ORDER — GABAPENTIN 300 MG/1
300 CAPSULE ORAL 3 TIMES DAILY
Qty: 90 CAPSULE | Refills: 11 | Status: SHIPPED | OUTPATIENT
Start: 2024-01-04 | End: 2025-01-03

## 2024-01-04 RX ORDER — EPINEPHRINE 0.3 MG/.3ML
INJECTION SUBCUTANEOUS
COMMUNITY

## 2024-01-04 RX ORDER — ALPRAZOLAM 0.5 MG/1
0.5 TABLET ORAL
COMMUNITY
Start: 2023-12-28

## 2024-01-04 RX ORDER — CARBAMAZEPINE 100 MG/1
200 TABLET, EXTENDED RELEASE ORAL 2 TIMES DAILY
Qty: 60 TABLET | Refills: 3 | Status: SHIPPED | OUTPATIENT
Start: 2024-01-04 | End: 2024-07-02

## 2024-01-04 RX ORDER — CARVEDILOL 3.12 MG/1
TABLET ORAL
COMMUNITY

## 2024-01-04 RX ORDER — PREDNISONE 20 MG/1
TABLET ORAL
COMMUNITY

## 2024-01-04 RX ORDER — BUSPIRONE HYDROCHLORIDE 5 MG/1
5 TABLET ORAL
COMMUNITY

## 2024-01-04 RX ORDER — CLOPIDOGREL BISULFATE 75 MG/1
TABLET ORAL
COMMUNITY

## 2024-01-04 RX ORDER — ZOLPIDEM TARTRATE 5 MG/1
5 TABLET ORAL NIGHTLY PRN
COMMUNITY
Start: 2023-09-18

## 2024-01-04 RX ORDER — GABAPENTIN 300 MG/1
300 CAPSULE ORAL 2 TIMES DAILY
Qty: 60 CAPSULE | Refills: 11 | Status: SHIPPED | OUTPATIENT
Start: 2024-01-04 | End: 2024-01-04

## 2024-01-04 RX ORDER — SACUBITRIL AND VALSARTAN 24; 26 MG/1; MG/1
TABLET, FILM COATED ORAL
COMMUNITY
Start: 2021-12-01

## 2024-01-04 NOTE — PATIENT INSTRUCTIONS
Do not take Buspar in combination with gabapentin     Take 300mg (one capsule) of gabapentin at night for several days if that gives you enough relief continue    If this does not provide enough relief increase to 600mg (two capsules) at night to see if this can provide relief    If this is not sufficient still can increase to 900mg (3 capsules) at night

## 2024-01-04 NOTE — PROGRESS NOTES
Subjective:     Chief Complaint:  CIDP      Interval History 01/04/2024    I have reviewed all relevant history in Epic. The patient presents for routine follow up regarding CIDP for which he is currently managed with 50g IVIg per day over two days [id number] orders in place via Dr. Guevara on the [facility]. Here for a follow up. Given that Dr. Guevara has her name on the therapy plan previously but per understanding Dr. López is now on the therapy plan. Per patient and his wife they were not able to add on the IV piggyback due to the port, per the home infusion nurse. Discussed that we can pre-hydrate with NS 1/2 liter. He notes that he is still getting some headaches following infusions and some worsening back pain from baseline back pain. He notes that he does premedicate with tylenol and ibuprofen. He finds that his back pain does prevent him from completing activities for several days (2 weeks since previous infusion). Reports that he has severe tightness/pain in the bilateral feet.       Interval History 10/26/2023    I have reviewed all relevant history in Epic. The patient presents for routine follow up regarding CIDP, not seen in my clinic for a few years. He is currently managed with IVIg 100g over 2 days (50g per day) [id number]. No Prednisone (difficult to control DM2). He was previously being managed by Dr. Guevara, and prior to the Dr. Moeller,  however was referred back to [facility]. He has noticed that he is having increased falls and imbalance recently. Per the patients wife he began to steadily decline following a covid vaccine administration in August. Immediately following his infusions he finds that he is very somnolent and requires a lot of rest. He is not receiving NS piggyback.         History of Present Illness:  I have reviewed all relevant medical records in Epic. Hector Garcia is a 63 y.o. male who presents today for re-establishment of care for CIDP. On chat review the patient was seen by  me in 2018 but was then lost to follow up. Ever since then he has been following with and has been managed by Dr Moeller. He is here today stating that he wants to start following with us again. He is currently receiving IVIG 80g q28 days and also carbamazepine 200 qhs for jerky movements of his legs. He states that his main complain from a neurologic standpoint, has always been his legs. 3 months ago starting having back pain. MRI showed disc disease but surgery was not recommended since complicated heart history (multiple Mis SP stents and open heart surgery). Since the back started causing pain, he noticed that his R leg became weaker than his left.     HPI on 2/14/2018:   Symptoms began in April 2017 with troubles in LE with weakness, increased difficulty controlling motor skills, tingling and severe gastrocnemius and foot muscle cramping. UEs have been unaffected. He has noted diminished thigh and calf girth as well as a 20 pound weight loss since then. He still works full time as a  but has been having increasing difficulties standing for prolonged periods of time and his gait is impaired by the end of the day. Her has had work up that produced CSF protein of 94 (90L and 10N) as well as EDX which showed absent LE SNAPs and reduced CMAPs with CVs in the low 30s consistent with demyelination. Results are likely too severe to be explained by DM2 alone. He has had transient elevation of CK in the 600s, but mostly it has been normal. He was initially stopped on statins but that has not improved leg cramping.    Review of Systems  Review of Systems   Constitutional:  Positive for fatigue. Negative for activity change.   HENT:  Negative for congestion.    Eyes:  Negative for visual disturbance.   Respiratory:  Negative for apnea and chest tightness.    Cardiovascular:  Negative for chest pain, palpitations and leg swelling.   Gastrointestinal:  Negative for abdominal distention.   Genitourinary:   "Negative for difficulty urinating and dysuria.   Musculoskeletal:  Positive for arthralgias, back pain and gait problem. Negative for joint swelling, myalgias, neck pain and neck stiffness.   Skin:  Negative for color change.   Neurological:  Negative for dizziness.   Psychiatric/Behavioral:  Negative for agitation.         Objective:     Vitals:    01/04/24 1448   BP: 100/71   Pulse: 74   Height: 6' 3" (1.905 m)   PainSc:   7         Neurologic Exam     Mental Status   Oriented to person, place, and time.   Registration: recalls 1 of 3 objects.   Attention: normal. Concentration: normal.   Speech: speech is normal   Level of consciousness: alert  Knowledge: good.     Cranial Nerves     CN II   Visual fields full to confrontation.     CN III, IV, VI   Pupils are equal, round, and reactive to light.    CN V   Facial sensation intact.     CN VII   Facial expression full, symmetric.     CN VIII   CN VIII normal.     CN IX, X   CN IX normal.   CN X normal.     CN XI   CN XI normal.     CN XII   CN XII normal.     Motor Exam   Overall muscle tone: normal    Strength   Strength 5/5 except as noted. Left hip flexion 4+/5  Left Knee Extension 4/5         Sensory Exam   Right arm light touch: normal  Left arm light touch: normal  Right leg light touch: normal  Left leg light touch: normal    Gait, Coordination, and Reflexes     Gait  Gait: normal    Tremor   Resting tremor: absent  Intention tremor: absent  Action tremor: absent    Reflexes   Right brachioradialis: 2+  Left brachioradialis: 2+  Right biceps: 2+  Left biceps: 2+  Right triceps: 2+  Left triceps: 2+  Right patellar: 2+  Left patellar: 2+  Right achilles: 2+  Left achilles: 2+  Right : 2+  Left : 2+      Physical Exam  HENT:      Head: Normocephalic and atraumatic.      Right Ear: Tympanic membrane normal.      Left Ear: Tympanic membrane normal.      Mouth/Throat:      Mouth: Mucous membranes are moist.   Eyes:      Extraocular Movements: Extraocular " movements intact.      Pupils: Pupils are equal, round, and reactive to light.   Cardiovascular:      Rate and Rhythm: Normal rate and regular rhythm.      Pulses: Normal pulses.      Heart sounds: Normal heart sounds.   Pulmonary:      Effort: Pulmonary effort is normal.   Abdominal:      General: Abdomen is flat.   Musculoskeletal:         General: Normal range of motion.      Cervical back: Normal range of motion.   Neurological:      Mental Status: He is alert and oriented to person, place, and time.      Gait: Gait is intact.      Deep Tendon Reflexes:      Reflex Scores:       Tricep reflexes are 2+ on the right side and 2+ on the left side.       Bicep reflexes are 2+ on the right side and 2+ on the left side.       Brachioradialis reflexes are 2+ on the right side and 2+ on the left side.       Patellar reflexes are 2+ on the right side and 2+ on the left side.       Achilles reflexes are 2+ on the right side and 2+ on the left side.  Psychiatric:         Mood and Affect: Mood normal.         Speech: Speech normal.           Lab Results   Component Value Date    WBC  (A) 07/03/2021    HGB 13.9 (L) 11/24/2019    HCT 40.5 11/24/2019     (L) 11/24/2019    CHOL 146 02/23/2019    TRIG 193 (H) 02/23/2019    HDL 26 (L) 02/23/2019    ALT 13 01/18/2023    AST 20 01/18/2023     (L) 01/18/2023    K 3.9 01/18/2023     11/24/2019    CREATININE 1.19 01/18/2023    BUN 14.3 01/18/2023    CO2 26 01/18/2023    TSH 2.37 11/21/2017    HGBA1C 6.2 (H) 02/23/2019    [id number] 11/21/2017     Per outside records provided by patient on 11/28/23  Creatinine 1.18   Sodium 139      Assessment and Plan:   Discussed with the patient that we could consider adding on some lyrica or gabapentin to help with aiding the patients pain and discomfort however given the patients other medications do have some hesitancy to prescribe any medication which may interfere.     Discussion with the patient will do a trial of  BID gabapentin 300mg to see if this can provide some relief for the patients back pain patient to try 300mg QHS if this is not sufficient can increase to 600mg QHS     Patient discussion he does not take Buspar he will continue to not take it as the patient does not find it provides benefit     Carbamazepine refilled in clinic today     Problem List Items Addressed This Visit       CIDP (chronic inflammatory demyelinating polyneuropathy)    Overview     Reciving home infusions via [facility]     50g per day for 2 days every 3 weeks    Previously tried and failed prednisone 2/2 elevated A1C.          Relevant Medications    gabapentin (NEURONTIN) 300 MG capsule    Other Relevant Orders    Comprehensive metabolic panel    Type 2 diabetes mellitus without complication (Chronic)    Current Assessment & Plan     Patients diabetes mellitus type 2 status puts them at an increased risk for neuropathy, stroke, heart attack, and atherosclerosis were discussed with the patient. They voiced understanding. Patient is currently being managed by PCP for DM2. Discussed importance of maintaining adequate control of sugars in order to prevent further disease progression.            Dyslipidemia    Current Assessment & Plan     On appropriate medications and managed by PCP. We discussed the importance of managing all secondary stroke risk factors, including hyperlipidemia.           Essential hypertension - Primary    Current Assessment & Plan     Discussed with patient importance of management of hypertension due to secondary stroke risk. Patient is on appropriate therapy currently managed by PCP.            Paresthesia of both legs    Current Assessment & Plan     He takes carbamazepine per Dr. Moeller. Sodium levels are normal. He still experiences pins and needles / painful sensation in the legs, primarily in the evenings and overnight. He does have cardiovascular disease related to chronic heavy smoking, which may contribute to poor  LE perfusion. He reports improvement in symptoms when upright and ambulating, and this sounds more like RLS. No known GI bleeds or evidence of anemia. He is not excited about trying more medications but is willing to add gabapentin in the evening and QHS to address. Renal function is normal.   - Start gabapentin 300 QHS, adding another 300 mg in the afternoon or evening as needed to control symptoms without causing next day somnolence. He can add a third 300 mg (900 mg total) QPM or QHS as needed.          Coronary artery disease with stable angina pectoris    Current Assessment & Plan     Managed and monitored by cardiology and patient is currently stable         Muscle twitching    Relevant Medications    carBAMazepine (TEGRETOL XR) 100 MG 12 hr tablet         RTC in 3-4 months or sooner if needed     Due to the patient complexity and requirement of ongoing infusion therapy the level of care for service is a level 5. This is due to the patient care treatment plan meeting the requirement for drug therapy requiring intensive monitoring for toxicity.       DELMA Scott MD  [facility]

## 2024-01-07 NOTE — ASSESSMENT & PLAN NOTE
He takes carbamazepine per Dr. Moeller. Sodium levels are normal. He still experiences pins and needles / painful sensation in the legs, primarily in the evenings and overnight. He does have cardiovascular disease related to chronic heavy smoking, which may contribute to poor LE perfusion. He reports improvement in symptoms when upright and ambulating, and this sounds more like RLS. No known GI bleeds or evidence of anemia. He is not excited about trying more medications but is willing to add gabapentin in the evening and QHS to address. Renal function is normal.   - Start gabapentin 300 QHS, adding another 300 mg in the afternoon or evening as needed to control symptoms without causing next day somnolence. He can add a third 300 mg (900 mg total) QPM or QHS as needed.

## 2024-04-03 ENCOUNTER — TELEPHONE (OUTPATIENT)
Dept: NEUROLOGY | Facility: CLINIC | Age: 64
End: 2024-04-03
Payer: COMMERCIAL

## 2024-04-11 ENCOUNTER — PATIENT MESSAGE (OUTPATIENT)
Dept: NEUROLOGY | Facility: CLINIC | Age: 64
End: 2024-04-11
Payer: COMMERCIAL

## 2024-04-19 ENCOUNTER — TELEPHONE (OUTPATIENT)
Dept: NEUROLOGY | Facility: CLINIC | Age: 64
End: 2024-04-19
Payer: COMMERCIAL

## 2024-04-29 ENCOUNTER — TELEPHONE (OUTPATIENT)
Dept: NEUROLOGY | Facility: CLINIC | Age: 64
End: 2024-04-29
Payer: COMMERCIAL

## 2024-04-29 NOTE — TELEPHONE ENCOUNTER
IVIG ([facility])    Message received from Kristie with [facility] requesting updated clinicals.     HIM faxed to [phone]      Dalia Rivas RN, BSN, BS  [facility] Clinical Care Coordinator  [phone]

## 2024-05-10 ENCOUNTER — TELEPHONE (OUTPATIENT)
Dept: NEUROLOGY | Facility: CLINIC | Age: 64
End: 2024-05-10
Payer: COMMERCIAL

## 2024-06-13 ENCOUNTER — TELEPHONE (OUTPATIENT)
Dept: NEUROLOGY | Facility: CLINIC | Age: 64
End: 2024-06-13
Payer: COMMERCIAL

## 2024-06-13 NOTE — TELEPHONE ENCOUNTER
Returned call to Nurse Calvin regarding IVIG.    Message sent to [employer] [place].     Mr. Young to receive his IVIG. No hx of strokes. Received Carotid U/S results yesterday and 80% blocked Tx Plavis 75mg QD. Patient concerned. Inquiring if okay to proceed with IVIG infusions. Patient denies hx of strokes.       Dalia Rivas RN, BSN, BS  [facility] Clinical Care Coordinator  [phone]

## 2024-06-13 NOTE — TELEPHONE ENCOUNTER
----- Message from Pratibha Kirk sent at 6/13/2024 10:26 AM CDT -----  Regarding: Pharm calling to make sure pt was adv that he cld get his IVIG infusion on todat  Contact: [phone]  Pharmacy Calling to Clarify an RX       Name of Caller# [facility] ([facility])       Pharmacy Name# [facility]       Prescription Name# IVIG Infusion       What do they need to clarify? Elysia calling to make sure it was ok for Pt to have his IVIG infusion on today. Please advise       Best Call Back Number# [phone]       Additional Information:

## 2024-07-08 ENCOUNTER — TELEPHONE (OUTPATIENT)
Dept: NEUROLOGY | Facility: CLINIC | Age: 64
End: 2024-07-08
Payer: COMMERCIAL

## 2024-09-13 ENCOUNTER — TELEPHONE (OUTPATIENT)
Dept: NEUROLOGY | Facility: CLINIC | Age: 64
End: 2024-09-13
Payer: COMMERCIAL

## 2024-10-16 NOTE — ASSESSMENT & PLAN NOTE
Absent sural and superficial peroneal responses on NCS done at outside facility. No abnormalities on pin or light touch, but large fiber neuropathy in bilateral LEs to the ankles. B12, Hep Panel, SPEP, TSH all normal. A1c = 6.2   - Repeat NCS  
On appropriate medications and managed by PCP. We discussed the importance of managing all secondary stroke risk factors, including dyslipidemia.    
On appropriate medications and managed by PCP. We discussed the importance of managing all secondary stroke risk factors, including hypertension.    
On no medications and followed by PCP. We discussed the importance of managing all secondary stroke risk factors, including DM2.    
Progressive atrophy since April 2017 along with total weight loss of about 20 pounds, primarily in the LEs. Diminished thigh and calf circumferences. EDX and SCF protein are suggestive of CIDP. Patient is not interested in pursuing IVIg or steroids at this time. He will do some reading of materials provided and discuss with family and let me know in the next few weeks.   - RTC for NCS / EMG since study performed in November 2017 is sub-par.  
Worse in the calves and the foot intrinsic muscles, though also occurring in the proximal legs. Worsened with prolonged standing and with use. Labs since April 2017 show a transient increase in the CK up to 600s, though this is in the setting of working out and prolonged standing at work. Possible element of dehydration and electrolyte deficiency, but primarily related to the process contributing to loss of leg muscle mass and relative weakness. No longer taking statins and there has been no clinical improvement since stopping them several months ago. CSF and NCS suggestive of CIDP, though he does have well-preserved DTRs on examination today.   - Trial of Tegretol 100 Q12 hours   - BMP today and in one month to monitor sodium   - Work on proper hydration    
No